# Patient Record
Sex: MALE | Race: OTHER | NOT HISPANIC OR LATINO | URBAN - METROPOLITAN AREA
[De-identification: names, ages, dates, MRNs, and addresses within clinical notes are randomized per-mention and may not be internally consistent; named-entity substitution may affect disease eponyms.]

---

## 2018-07-10 ENCOUNTER — EMERGENCY (EMERGENCY)
Facility: HOSPITAL | Age: 48
LOS: 1 days | Discharge: HOME | End: 2018-07-10
Attending: EMERGENCY MEDICINE | Admitting: EMERGENCY MEDICINE
Payer: MEDICAID

## 2018-07-10 VITALS
SYSTOLIC BLOOD PRESSURE: 138 MMHG | DIASTOLIC BLOOD PRESSURE: 88 MMHG | TEMPERATURE: 99 F | HEART RATE: 98 BPM | RESPIRATION RATE: 18 BRPM | OXYGEN SATURATION: 99 %

## 2018-07-10 VITALS
SYSTOLIC BLOOD PRESSURE: 125 MMHG | HEART RATE: 85 BPM | DIASTOLIC BLOOD PRESSURE: 93 MMHG | RESPIRATION RATE: 18 BRPM | OXYGEN SATURATION: 99 %

## 2018-07-10 LAB
ALBUMIN SERPL ELPH-MCNC: 4.5 G/DL — SIGNIFICANT CHANGE UP (ref 3.3–5)
ALP SERPL-CCNC: 38 U/L — LOW (ref 40–120)
ALT FLD-CCNC: 104 U/L — HIGH (ref 4–41)
AST SERPL-CCNC: 45 U/L — HIGH (ref 4–40)
BASE EXCESS BLDV CALC-SCNC: 5 MMOL/L — SIGNIFICANT CHANGE UP
BASOPHILS # BLD AUTO: 0.03 K/UL — SIGNIFICANT CHANGE UP (ref 0–0.2)
BASOPHILS NFR BLD AUTO: 0.4 % — SIGNIFICANT CHANGE UP (ref 0–2)
BILIRUB SERPL-MCNC: 1.2 MG/DL — SIGNIFICANT CHANGE UP (ref 0.2–1.2)
BLOOD GAS VENOUS - CREATININE: 0.9 MG/DL — SIGNIFICANT CHANGE UP (ref 0.5–1.3)
BUN SERPL-MCNC: 13 MG/DL — SIGNIFICANT CHANGE UP (ref 7–23)
CALCIUM SERPL-MCNC: 10 MG/DL — SIGNIFICANT CHANGE UP (ref 8.4–10.5)
CHLORIDE BLDV-SCNC: 107 MMOL/L — SIGNIFICANT CHANGE UP (ref 96–108)
CHLORIDE SERPL-SCNC: 102 MMOL/L — SIGNIFICANT CHANGE UP (ref 98–107)
CO2 SERPL-SCNC: 26 MMOL/L — SIGNIFICANT CHANGE UP (ref 22–31)
CREAT SERPL-MCNC: 0.93 MG/DL — SIGNIFICANT CHANGE UP (ref 0.5–1.3)
EOSINOPHIL # BLD AUTO: 0.07 K/UL — SIGNIFICANT CHANGE UP (ref 0–0.5)
EOSINOPHIL NFR BLD AUTO: 0.9 % — SIGNIFICANT CHANGE UP (ref 0–6)
GAS PNL BLDV: 141 MMOL/L — SIGNIFICANT CHANGE UP (ref 136–146)
GLUCOSE BLDV-MCNC: 108 — HIGH (ref 70–99)
GLUCOSE SERPL-MCNC: 110 MG/DL — HIGH (ref 70–99)
HCO3 BLDV-SCNC: 26 MMOL/L — SIGNIFICANT CHANGE UP (ref 20–27)
HCT VFR BLD CALC: 46 % — SIGNIFICANT CHANGE UP (ref 39–50)
HCT VFR BLDV CALC: 52.1 % — HIGH (ref 39–51)
HGB BLD-MCNC: 15.8 G/DL — SIGNIFICANT CHANGE UP (ref 13–17)
HGB BLDV-MCNC: 17 G/DL — SIGNIFICANT CHANGE UP (ref 13–17)
IMM GRANULOCYTES # BLD AUTO: 0.04 # — SIGNIFICANT CHANGE UP
IMM GRANULOCYTES NFR BLD AUTO: 0.5 % — SIGNIFICANT CHANGE UP (ref 0–1.5)
LACTATE BLDV-MCNC: 2.1 MMOL/L — HIGH (ref 0.5–2)
LIDOCAIN IGE QN: 50.5 U/L — SIGNIFICANT CHANGE UP (ref 7–60)
LYMPHOCYTES # BLD AUTO: 1.72 K/UL — SIGNIFICANT CHANGE UP (ref 1–3.3)
LYMPHOCYTES # BLD AUTO: 22.1 % — SIGNIFICANT CHANGE UP (ref 13–44)
MCHC RBC-ENTMCNC: 27.9 PG — SIGNIFICANT CHANGE UP (ref 27–34)
MCHC RBC-ENTMCNC: 34.3 % — SIGNIFICANT CHANGE UP (ref 32–36)
MCV RBC AUTO: 81.1 FL — SIGNIFICANT CHANGE UP (ref 80–100)
MONOCYTES # BLD AUTO: 0.76 K/UL — SIGNIFICANT CHANGE UP (ref 0–0.9)
MONOCYTES NFR BLD AUTO: 9.8 % — SIGNIFICANT CHANGE UP (ref 2–14)
NEUTROPHILS # BLD AUTO: 5.15 K/UL — SIGNIFICANT CHANGE UP (ref 1.8–7.4)
NEUTROPHILS NFR BLD AUTO: 66.3 % — SIGNIFICANT CHANGE UP (ref 43–77)
NRBC # FLD: 0 — SIGNIFICANT CHANGE UP
PCO2 BLDV: 54 MMHG — HIGH (ref 41–51)
PH BLDV: 7.37 PH — SIGNIFICANT CHANGE UP (ref 7.32–7.43)
PLATELET # BLD AUTO: 290 K/UL — SIGNIFICANT CHANGE UP (ref 150–400)
PMV BLD: 10.2 FL — SIGNIFICANT CHANGE UP (ref 7–13)
PO2 BLDV: 26 MMHG — LOW (ref 35–40)
POTASSIUM BLDV-SCNC: 3.7 MMOL/L — SIGNIFICANT CHANGE UP (ref 3.4–4.5)
POTASSIUM SERPL-MCNC: 4.1 MMOL/L — SIGNIFICANT CHANGE UP (ref 3.5–5.3)
POTASSIUM SERPL-SCNC: 4.1 MMOL/L — SIGNIFICANT CHANGE UP (ref 3.5–5.3)
PROT SERPL-MCNC: 8 G/DL — SIGNIFICANT CHANGE UP (ref 6–8.3)
RBC # BLD: 5.67 M/UL — SIGNIFICANT CHANGE UP (ref 4.2–5.8)
RBC # FLD: 12.4 % — SIGNIFICANT CHANGE UP (ref 10.3–14.5)
SAO2 % BLDV: 42.8 % — LOW (ref 60–85)
SODIUM SERPL-SCNC: 142 MMOL/L — SIGNIFICANT CHANGE UP (ref 135–145)
TSH SERPL-MCNC: 1.36 UIU/ML — SIGNIFICANT CHANGE UP (ref 0.27–4.2)
WBC # BLD: 7.77 K/UL — SIGNIFICANT CHANGE UP (ref 3.8–10.5)
WBC # FLD AUTO: 7.77 K/UL — SIGNIFICANT CHANGE UP (ref 3.8–10.5)

## 2018-07-10 PROCEDURE — 93010 ELECTROCARDIOGRAM REPORT: CPT

## 2018-07-10 PROCEDURE — 71046 X-RAY EXAM CHEST 2 VIEWS: CPT | Mod: 26

## 2018-07-10 PROCEDURE — 99284 EMERGENCY DEPT VISIT MOD MDM: CPT | Mod: 25

## 2018-07-10 RX ORDER — ACETAMINOPHEN 500 MG
975 TABLET ORAL ONCE
Qty: 0 | Refills: 0 | Status: COMPLETED | OUTPATIENT
Start: 2018-07-10 | End: 2018-07-10

## 2018-07-10 RX ORDER — ONDANSETRON 8 MG/1
4 TABLET, FILM COATED ORAL ONCE
Qty: 0 | Refills: 0 | Status: COMPLETED | OUTPATIENT
Start: 2018-07-10 | End: 2018-07-10

## 2018-07-10 RX ORDER — SODIUM CHLORIDE 9 MG/ML
1000 INJECTION INTRAMUSCULAR; INTRAVENOUS; SUBCUTANEOUS ONCE
Qty: 0 | Refills: 0 | Status: COMPLETED | OUTPATIENT
Start: 2018-07-10 | End: 2018-07-10

## 2018-07-10 RX ADMIN — Medication 975 MILLIGRAM(S): at 15:49

## 2018-07-10 RX ADMIN — ONDANSETRON 4 MILLIGRAM(S): 8 TABLET, FILM COATED ORAL at 16:18

## 2018-07-10 RX ADMIN — SODIUM CHLORIDE 1000 MILLILITER(S): 9 INJECTION INTRAMUSCULAR; INTRAVENOUS; SUBCUTANEOUS at 15:40

## 2018-07-10 NOTE — ED ADULT NURSE NOTE - OBJECTIVE STATEMENT
Break coverage- Pt received to intake spot #1. AAOx4, c/o abdominal pain/back pain and nausea x1 week. Also c/o intermittent SOB and chills x few weeks. Pt states he was diagnosed with typhoid fever and admitted in the hospital in [facility]. Respiratory even and unlabored at this time. No vomiting. VSS as documented. MD schilling performed. #20g IVSL placed to right ac, labs drawn and sent. no acute distress. Awaiting further plan of care.

## 2018-07-10 NOTE — ED PROVIDER NOTE - OBJECTIVE STATEMENT
46 yo M w/out pmh p/w intermittent chest / back discomfort and chills x 1 wk. +sob. Reports was dx'd w/ typhoid fever and admitted to hospital 6/19 in [facility], treated with doses of ciprofloxacin and doxycycline that didn't tolerate; finished iv meropenem and po cefuroxamine course (finished 7/4). No abd pain, rash, cough. Traveled to [place] 7/2. 48 yo M w/out pmh p/w intermittent abd / back discomfort and chills x 1 wk. +sob. Reports was dx'd w/ typhoid fever and admitted to hospital 6/19 in [facility], treated with doses of ciprofloxacin and doxycycline that didn't tolerate; finished iv meropenem and po cefuroxamine course (finished 7/4). No abd pain, rash, cough. Traveled to [place] 7/2.

## 2018-07-10 NOTE — ED PROVIDER NOTE - MEDICAL DECISION MAKING DETAILS
48 yo M w/ recent dx/tx typhoid fever; no active chest / back pain now or sob, will check labs, cxr, ekg, give fluids/tylenol, reassess 46 yo M w/ recent dx/tx typhoid fever; no active abd / back pain now or sob, will check labs, cxr, ekg, give fluids/tylenol, reassess

## 2018-07-10 NOTE — ED PROVIDER NOTE - ATTENDING CONTRIBUTION TO CARE
Pt was seen and evaluated by me. Pt is a 48 y/o male with no significant PMHx presenting to the ED for intermittent abd and back discomfort X 1 wk. Pt states over the past week having intermittent abd discomfort with some SOB. Pt notes he is from [facility] and was found to have typhoid and started on antibiotics with minimal improvement. Pt also notes having chills. Denies any fever, nausea, vomiting, chest pain, diarrhea, or dysuria. Lungs CTA b/l. RRR. Abd soft, non-tender.

## 2018-07-11 LAB
SPECIMEN SOURCE: SIGNIFICANT CHANGE UP
SPECIMEN SOURCE: SIGNIFICANT CHANGE UP

## 2018-07-11 RX ORDER — CLARITHROMYCIN 500 MG
1 TABLET ORAL
Qty: 1 | Refills: 0 | OUTPATIENT
Start: 2018-07-11 | End: 2018-07-15

## 2018-07-11 NOTE — ED POST DISCHARGE NOTE - RESULT SUMMARY
Pt called. Rx for Zithromax not sent to pharmacy. I confirmed in chart pt should be on ABX and sent rx in at pt request.

## 2018-07-13 ENCOUNTER — INPATIENT (INPATIENT)
Facility: HOSPITAL | Age: 48
LOS: 2 days | Discharge: HOME | End: 2018-07-16
Attending: HOSPITALIST | Admitting: HOSPITALIST
Payer: MEDICAID

## 2018-07-13 VITALS
RESPIRATION RATE: 20 BRPM | SYSTOLIC BLOOD PRESSURE: 131 MMHG | HEART RATE: 94 BPM | DIASTOLIC BLOOD PRESSURE: 95 MMHG | OXYGEN SATURATION: 100 % | TEMPERATURE: 99 F
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
## 2018-07-13 DIAGNOSIS — A01.00 TYPHOID FEVER, UNSPECIFIED: ICD-10-CM

## 2018-07-13 DIAGNOSIS — F41.8 OTHER SPECIFIED ANXIETY DISORDERS: ICD-10-CM

## 2018-07-13 LAB
ALBUMIN SERPL ELPH-MCNC: 4.5 G/DL — SIGNIFICANT CHANGE UP (ref 3.3–5)
ALP SERPL-CCNC: 26 U/L — LOW (ref 40–120)
ALT FLD-CCNC: 133 U/L — HIGH (ref 4–41)
APPEARANCE UR: CLEAR — SIGNIFICANT CHANGE UP
AST SERPL-CCNC: 96 U/L — HIGH (ref 4–40)
BILIRUB SERPL-MCNC: 1.6 MG/DL — HIGH (ref 0.2–1.2)
BILIRUB UR-MCNC: NEGATIVE — SIGNIFICANT CHANGE UP
BLOOD UR QL VISUAL: NEGATIVE — SIGNIFICANT CHANGE UP
BUN SERPL-MCNC: 11 MG/DL — SIGNIFICANT CHANGE UP (ref 7–23)
CALCIUM SERPL-MCNC: 9.8 MG/DL — SIGNIFICANT CHANGE UP (ref 8.4–10.5)
CHLORIDE SERPL-SCNC: 99 MMOL/L — SIGNIFICANT CHANGE UP (ref 98–107)
CO2 SERPL-SCNC: 21 MMOL/L — LOW (ref 22–31)
COD CRY URNS QL: SIGNIFICANT CHANGE UP (ref 0–0)
COLOR SPEC: YELLOW — SIGNIFICANT CHANGE UP
CREAT SERPL-MCNC: 0.81 MG/DL — SIGNIFICANT CHANGE UP (ref 0.5–1.3)
GLUCOSE SERPL-MCNC: 84 MG/DL — SIGNIFICANT CHANGE UP (ref 70–99)
GLUCOSE UR-MCNC: NEGATIVE — SIGNIFICANT CHANGE UP
HCT VFR BLD CALC: 47.8 % — SIGNIFICANT CHANGE UP (ref 39–50)
HGB BLD-MCNC: 16.2 G/DL — SIGNIFICANT CHANGE UP (ref 13–17)
KETONES UR-MCNC: SIGNIFICANT CHANGE UP
LEUKOCYTE ESTERASE UR-ACNC: NEGATIVE — SIGNIFICANT CHANGE UP
MCHC RBC-ENTMCNC: 28.2 PG — SIGNIFICANT CHANGE UP (ref 27–34)
MCHC RBC-ENTMCNC: 33.9 % — SIGNIFICANT CHANGE UP (ref 32–36)
MCV RBC AUTO: 83.1 FL — SIGNIFICANT CHANGE UP (ref 80–100)
MUCOUS THREADS # UR AUTO: SIGNIFICANT CHANGE UP
NITRITE UR-MCNC: NEGATIVE — SIGNIFICANT CHANGE UP
NRBC # FLD: 0 — SIGNIFICANT CHANGE UP
PH UR: 6 — SIGNIFICANT CHANGE UP (ref 4.6–8)
PLATELET # BLD AUTO: 306 K/UL — SIGNIFICANT CHANGE UP (ref 150–400)
PMV BLD: 10.5 FL — SIGNIFICANT CHANGE UP (ref 7–13)
POTASSIUM SERPL-MCNC: 6.1 MMOL/L — HIGH (ref 3.5–5.3)
POTASSIUM SERPL-SCNC: 6.1 MMOL/L — HIGH (ref 3.5–5.3)
PROT SERPL-MCNC: 8.2 G/DL — SIGNIFICANT CHANGE UP (ref 6–8.3)
PROT UR-MCNC: NEGATIVE MG/DL — SIGNIFICANT CHANGE UP
RBC # BLD: 5.75 M/UL — SIGNIFICANT CHANGE UP (ref 4.2–5.8)
RBC # FLD: 12.4 % — SIGNIFICANT CHANGE UP (ref 10.3–14.5)
RBC CASTS # UR COMP ASSIST: HIGH (ref 0–?)
SODIUM SERPL-SCNC: 137 MMOL/L — SIGNIFICANT CHANGE UP (ref 135–145)
SP GR SPEC: 1.02 — SIGNIFICANT CHANGE UP (ref 1–1.04)
SQUAMOUS # UR AUTO: SIGNIFICANT CHANGE UP
UROBILINOGEN FLD QL: NORMAL MG/DL — SIGNIFICANT CHANGE UP
WBC # BLD: 6.71 K/UL — SIGNIFICANT CHANGE UP (ref 3.8–10.5)
WBC # FLD AUTO: 6.71 K/UL — SIGNIFICANT CHANGE UP (ref 3.8–10.5)
WBC UR QL: SIGNIFICANT CHANGE UP (ref 0–?)

## 2018-07-13 PROCEDURE — 99223 1ST HOSP IP/OBS HIGH 75: CPT | Mod: GC

## 2018-07-13 RX ORDER — CEFTRIAXONE 500 MG/1
1 INJECTION, POWDER, FOR SOLUTION INTRAMUSCULAR; INTRAVENOUS ONCE
Qty: 0 | Refills: 0 | Status: COMPLETED | OUTPATIENT
Start: 2018-07-13 | End: 2018-07-13

## 2018-07-13 RX ADMIN — CEFTRIAXONE 100 GRAM(S): 500 INJECTION, POWDER, FOR SOLUTION INTRAMUSCULAR; INTRAVENOUS at 19:41

## 2018-07-13 NOTE — ED PROVIDER NOTE - MEDICAL DECISION MAKING DETAILS
Pt's episodes of fear/shaking concerning for anxiety/panic attacks, however unable to rule out Typhoid fever / typhoid carrying state. Will Admit to treat. Pt's episodes of fear/shaking concerning for anxiety/panic attacks, however unable to rule out Typhoid fever, will Admit to treat.

## 2018-07-13 NOTE — H&P ADULT - NSHPLABSRESULTS_GEN_ALL_CORE
Labs:     CBC Full  -  ( 13 Jul 2018 17:35 )  WBC Count : 6.71 K/uL  Hemoglobin : 16.2 g/dL  Hematocrit : 47.8 %  Platelet Count - Automated : 306 K/uL  Mean Cell Volume : 83.1 fL  Mean Cell Hemoglobin : 28.2 pg  Mean Cell Hemoglobin Concentration : 33.9 %  Auto Neutrophil # : x  Auto Lymphocyte # : x  Auto Monocyte # : x  Auto Eosinophil # : x  Auto Basophil # : x  Auto Neutrophil % : x  Auto Lymphocyte % : x  Auto Monocyte % : x  Auto Eosinophil % : x  Auto Basophil % : x    07-13    137  |  99  |  11  ----------------------------<  84  6.1<H>   |  21<L>  |  0.81    Ca    9.8      13 Jul 2018 17:35    TPro  8.2  /  Alb  4.5  /  TBili  1.6<H>  /  DBili  x   /  AST  96<H>  /  ALT  133<H>  /  AlkPhos  26<L>  07-13

## 2018-07-13 NOTE — ED ADULT NURSE NOTE - OBJECTIVE STATEMENT
Pt rcvd by INT RN - admitted to [facility] for Typhoid Fever.  Pt came to ED for c/o weakness, anxiety, "feeling unwell" for approx 3 wks.  Was previously treated on antibiotics for Typhoid Fever w/o resolution of symptoms since mid June w/ multiple hospital visits for same.  No significant PMHx or daily medications.  Pt aaox3, ambulatory but weak feeling.  Has IV to R AC #20g.  Previously given Ceftriaxone IV in ED.  Pt vitally stable.  Mask applied.  Awaiting further plan of care.

## 2018-07-13 NOTE — H&P ADULT - ASSESSMENT
47 Y Male with subjective Hx of typhoid fever, coming into hospital with symptoms of nervousness anxiety in the setting of recent typhoid infection.

## 2018-07-13 NOTE — H&P ADULT - PROBLEM SELECTOR PLAN 2
- Symptoms resolved from prior visit  - Trend Vitals - Symptoms resolved from prior visit  - Trend Vitals  - Stool Culture - Likely resolved. No longer having symptoms   - Trend Vitals  - Hold antibiotics

## 2018-07-13 NOTE — ED PROVIDER NOTE - SKIN NEGATIVE STATEMENT, MLM
no abrasions, no jaundice, no lesions, no pruritis, and no rashes. No spots or any other lesions on the abdomen.

## 2018-07-13 NOTE — H&P ADULT - NSHPREVIEWOFSYSTEMS_GEN_ALL_CORE
Review of Systems:   CONSTITUTIONAL: No fever/chills, + Loss of appetite  EYES: No eye pain, visual disturbances, or discharge  ENMT:  No difficulty hearing, tinnitus, vertigo; No sinus or throat pain  NECK: No pain or stiffness  BREASTS: No pain, masses, or nipple discharge  RESPIRATORY: No cough, wheezing, chills or hemoptysis; No shortness of breath  CARDIOVASCULAR: No chest pain, palpitations, dizziness, or leg swelling  GASTROINTESTINAL: +abdominal discomfort. No nausea, vomiting, or hematemesis; +diarrhea starting today. No constipation. No melena or hematochezia.  GENITOURINARY: No dysuria, frequency, hematuria, or incontinence  NEUROLOGICAL:   SKIN: No itching, burning, rashes, or lesions   LYMPH NODES: No enlarged glands  ENDOCRINE: No heat or cold intolerance; No hair loss  MUSCULOSKELETAL: No joint pain or swelling  PSYCHIATRIC: +anxiety, +restlessness  HEME/LYMPH: No easy bruising, or bleeding gums  ALLERY AND IMMUNOLOGIC: No hives or eczema Review of Systems:   CONSTITUTIONAL: No fever/chills, + Loss of appetite  EYES: No eye pain, visual disturbances, or discharge  ENMT:  No difficulty hearing, tinnitus, vertigo; No sinus or throat pain  NECK: No pain or stiffness  BREASTS: No pain, masses, or nipple discharge  RESPIRATORY: No cough, wheezing, chills or hemoptysis; No shortness of breath  CARDIOVASCULAR: No chest pain, palpitations, dizziness, or leg swelling  GASTROINTESTINAL: +abdominal discomfort. No nausea, vomiting, or hematemesis; +diarrhea starting today. No constipation. No melena or hematochezia.  GENITOURINARY: No dysuria, frequency, hematuria, or incontinence  NEUROLOGICAL: no dizziness, no headaches, no weakness  SKIN: No itching, burning, rashes, or lesions   LYMPH NODES: No enlarged glands  ENDOCRINE: No heat or cold intolerance; No hair loss  MUSCULOSKELETAL: No joint pain or swelling  PSYCHIATRIC: +anxiety, +restlessness  HEME/LYMPH: No easy bruising, or bleeding gums  ALLERGY AND IMMUNOLOGIC: No hives or eczema

## 2018-07-13 NOTE — ED PROVIDER NOTE - ATTENDING CONTRIBUTION TO CARE
48 yo M recently traveling from [facility] where he was diagnosed with typhoid s/p being treated with ciprofloxacin and doxycycline that he didn't tolerate; finished iv meropenem and po Cefuroxamine course (finished 7/4) on an inpatient visit. Continued to have the same sx and came back to the ER on 7/10 for which he was started on Azithromycin. Pt took this as prescribed although he did not improve, and came back today. Pt had some abdominal pain and intermittent fevers in June, however never n/v/or d. The pt's son is in [facility] today for n/v/d. Will discuss with Infectious disease as the patient appears to be a carrier and his son has symptoms of what may be typhoid. Pt describes palpitations, with multiple episodes of SOB, fear and "shakiness" lasting for 5 minutes for a few months for which he has seen a psychiatrist and been given meds which he does not take because he does not think that they decrease the symptoms.   -labs (cbc, cmp, tsh), fluids, reassess  I performed a history and physical exam of the patient and discussed their management with the resident. I reviewed the resident's note and agree with the documented findings and plan of care. My medical decision making and observations are found above.

## 2018-07-13 NOTE — H&P ADULT - NSHPSOCIALHISTORY_GEN_ALL_CORE
From [facility], has been in [facility] with Brother for past few months  No history of tobacco, alcohol, or drug use

## 2018-07-13 NOTE — ED ADULT TRIAGE NOTE - CHIEF COMPLAINT QUOTE
c/o feeling weak pt was treated on July 10 in ER for chills placed on Azithromycin pt returns states "I am still not feeling well"  pt history of typhoid fever

## 2018-07-13 NOTE — H&P ADULT - HISTORY OF PRESENT ILLNESS
48yo M,Patient is from [facility]. In June Pt had onset of chills, intermittent abdominal pain and loss of appetite. Patient was admitted to a hospital in [facility], treated with doses of ciprofloxacin and doxycycline that he didn't tolerate (unable to state why); finished iv meropenem and po Cefuroxamine course (finished 7/4). Today Pt reports feeling as if something is not right, when asked to explain further, pt indicates the he just feels frightened and nervous. Denies: CP, SOB, Palpitations GI discomfort, dysuria, hematuria. Headache, nausea and vomiting. Pt reports that he developed this anxiety a month ago and attributes it to being diagnosed with typhoid fever in [facility]. 48yo M,Patient is from [facility] living in [facility]. In June Pt had onset of chills, intermittent abdominal pain and loss of appetite. Patient was admitted to a hospital in [facility], treated with doses of ciprofloxacin and doxycycline that he didn't tolerate (unable to state why); finished iv meropenem and po Cefuroxamine course (finished 7/4). Today Pt reports feeling as if something is not right, when asked to explain further, pt indicates the he just feels frightened and nervous. Denies: CP, SOB, Palpitations GI discomfort, dysuria, hematuria. Headache, nausea and vomiting. Pt reports that he developed this anxiety a month ago and attributes it to being diagnosed with typhoid fever in [facility]. Today he has had decreased Appetite, only having breakfast. 48yo M,Patient is from [facility] living in [facility]. In June Pt had onset of chills, intermittent abdominal pain and loss of appetite. Patient was admitted to a hospital in [facility], treated with doses of ciprofloxacin and doxycycline that he didn't tolerate (unable to state why); finished iv meropenem and po Cefuroxamine course (finished 7/4). Today Pt reports feeling as if something is not right, when asked to explain further, pt indicates the he just feels frightened and nervous. Denies: CP, SOB, Palpitations GI discomfort, dysuria, hematuria. Headache, nausea and vomiting. Pt reports that he developed this anxiety a month ago and attributes it to being diagnosed with typhoid fever in [facility]. Today he has had decreased Appetite, only having breakfast. he additionally reports 1x episode of diarrhea in hospital.

## 2018-07-13 NOTE — H&P ADULT - PROBLEM SELECTOR PLAN 1
-c/w Psychiatry  - consider possible Anxiolytic med  - TFTS - Consult Psychiatry in am   - consider possible Anxiolytic med  - Pt has poor appetite, also reports feeling depressed. Denies dysphagia/odynophagia, nausea or vomiting

## 2018-07-13 NOTE — ED PROVIDER NOTE - GASTROINTESTINAL NEGATIVE STATEMENT, MLM
no current abdominal pain, no bloating, no constipation, no diarrhea, and no vomiting. Has Nausea and complete loss of appetite

## 2018-07-13 NOTE — ED PROVIDER NOTE - OBJECTIVE STATEMENT
Patient is from [facility] living in [facility]. In June Pt had onset of chills, intermittent abdominal pain and loss of appetite. Patient was admitted to a hospital in [facility], treated with doses of ciprofloxacin and doxycycline that he didn't tolerate (unable to state why); finished iv meropenem and po Cefuroxamine course (finished 7/4). Continued to have the same sx and came back to the ER on 7/10 for which he was started on Azithromycin. Pt took this as prescribed although he did not improve, and came back today. States that many people have been diagnosed with Tyhpoid fever in his area in [facility] recently, however since coming to the [place] his son has developed vomiting/diarrhea and was diagnosed with Gastroenteritis at [facility]. Patient has also been experiencing episodes of SOB, fear and "shakiness" lasting for 5 minuetes for a few months which also worsened in early June, pt denies any new stressors or any other changes to his life recently.  No pain anywhere now, has not had any diarrhea or vomiting. No fevers. Patient is from [facility] living in [facility]. In June Pt had onset of chills, intermittent abdominal pain and loss of appetite. Patient was admitted to a hospital in [facility], treated with doses of ciprofloxacin and doxycycline that he didn't tolerate (unable to state why); finished iv meropenem and po Cefuroxamine course (finished 7/4). Continued to have the same sx and came back to the ER on 7/10 for which he was started on Azithromycin. Pt took this as prescribed although he did not improve, and came back today. States that many people have been diagnosed with Tyhpoid fever in his area in [facility] recently. Since coming to the  his son has developed vomiting/diarrhea and was diagnosed with Gastroenteritis at [facility]. Patient has also been experiencing episodes of SOB, fear and "shakiness" lasting for 5 minuetes for a few months which also worsened in early June, pt denies any new stressors or any other changes to his life recently.  No pain anywhere now, has not had any diarrhea or vomiting. No fevers.

## 2018-07-13 NOTE — H&P ADULT - PROBLEM SELECTOR PLAN 3
- No abdominal pain  - Will trend LFTs. Obtain US abdomen - No abdominal pain. Episode of diarrhea in ED but was related to feeling of anxiety and stress. Possible IBS  - Will trend LFTs. Obtain US abdomen - No abdominal pain. Episode of diarrhea in ED but was related to feeling of anxiety and stress. Possible IBS. Consider immodium  - Will trend LFTs. Obtain US abdomen

## 2018-07-13 NOTE — H&P ADULT - NSHPPHYSICALEXAM_GEN_ALL_CORE
PHYSICAL EXAM:  GENERAL: NAD, well-developed  HEAD:  Atraumatic, Normocephalic  NECK: Supple, No JVD  CHEST/LUNG: Clear to auscultation bilaterally; No wheeze  HEART: Regular rate and rhythm; No murmurs, rubs, or gallops  ABDOMEN: Soft, Nontender, Nondistended; Bowel sounds present  EXTREMITIES:, No clubbing, cyanosis, or edema  PSYCH: AAOx3  NEUROLOGY: non-focal  SKIN: No rashes or lesions T(C): 36.9 (07-13-18 @ 20:37), Max: 37.1 (07-13-18 @ 15:55)  HR: 80 (07-13-18 @ 20:37) (80 - 94)  BP: 129/91 (07-13-18 @ 20:37) (129/91 - 131/95)  RR: 16 (07-13-18 @ 20:37) (16 - 20)  SpO2: 98% (07-13-18 @ 20:37) (98% - 100%)    PHYSICAL EXAM:  GENERAL: NAD, well-developed  HEAD:  Atraumatic, Normocephalic  NECK: Supple, No JVD  CHEST/LUNG: Clear to auscultation bilaterally; No wheeze  HEART: Regular rate and rhythm; No murmurs, rubs, or gallops  ABDOMEN: Soft, Nontender, Nondistended; Bowel sounds present  EXTREMITIES:, No clubbing, cyanosis, or edema  PSYCH: AAOx3, normal affect   NEUROLOGY: non-focal, 5/5 strength throughout, normal sensation   SKIN: No rashes or lesions

## 2018-07-14 ENCOUNTER — TRANSCRIPTION ENCOUNTER (OUTPATIENT)
Age: 48
End: 2018-07-14

## 2018-07-14 DIAGNOSIS — R74.8 ABNORMAL LEVELS OF OTHER SERUM ENZYMES: ICD-10-CM

## 2018-07-14 LAB
ALBUMIN SERPL ELPH-MCNC: 4.3 G/DL — SIGNIFICANT CHANGE UP (ref 3.3–5)
ALP SERPL-CCNC: 36 U/L — LOW (ref 40–120)
ALT FLD-CCNC: 113 U/L — HIGH (ref 4–41)
AST SERPL-CCNC: 47 U/L — HIGH (ref 4–40)
BASOPHILS # BLD AUTO: 0.02 K/UL — SIGNIFICANT CHANGE UP (ref 0–0.2)
BASOPHILS NFR BLD AUTO: 0.3 % — SIGNIFICANT CHANGE UP (ref 0–2)
BILIRUB DIRECT SERPL-MCNC: 0.2 MG/DL — SIGNIFICANT CHANGE UP (ref 0.1–0.2)
BILIRUB SERPL-MCNC: 1.6 MG/DL — HIGH (ref 0.2–1.2)
BUN SERPL-MCNC: 13 MG/DL — SIGNIFICANT CHANGE UP (ref 7–23)
BUN SERPL-MCNC: 14 MG/DL — SIGNIFICANT CHANGE UP (ref 7–23)
CALCIUM SERPL-MCNC: 9.6 MG/DL — SIGNIFICANT CHANGE UP (ref 8.4–10.5)
CALCIUM SERPL-MCNC: 9.8 MG/DL — SIGNIFICANT CHANGE UP (ref 8.4–10.5)
CHLORIDE SERPL-SCNC: 101 MMOL/L — SIGNIFICANT CHANGE UP (ref 98–107)
CHLORIDE SERPL-SCNC: 99 MMOL/L — SIGNIFICANT CHANGE UP (ref 98–107)
CO2 SERPL-SCNC: 24 MMOL/L — SIGNIFICANT CHANGE UP (ref 22–31)
CO2 SERPL-SCNC: 26 MMOL/L — SIGNIFICANT CHANGE UP (ref 22–31)
CREAT SERPL-MCNC: 0.86 MG/DL — SIGNIFICANT CHANGE UP (ref 0.5–1.3)
CREAT SERPL-MCNC: 0.91 MG/DL — SIGNIFICANT CHANGE UP (ref 0.5–1.3)
EOSINOPHIL # BLD AUTO: 0.08 K/UL — SIGNIFICANT CHANGE UP (ref 0–0.5)
EOSINOPHIL NFR BLD AUTO: 1.1 % — SIGNIFICANT CHANGE UP (ref 0–6)
GLUCOSE SERPL-MCNC: 102 MG/DL — HIGH (ref 70–99)
GLUCOSE SERPL-MCNC: 176 MG/DL — HIGH (ref 70–99)
HAPTOGLOB SERPL-MCNC: 152 MG/DL — SIGNIFICANT CHANGE UP (ref 34–200)
HAV IGM SER-ACNC: NONREACTIVE — SIGNIFICANT CHANGE UP
HBV CORE IGM SER-ACNC: NONREACTIVE — SIGNIFICANT CHANGE UP
HBV SURFACE AG SER-ACNC: NONREACTIVE — SIGNIFICANT CHANGE UP
HCT VFR BLD CALC: 45.8 % — SIGNIFICANT CHANGE UP (ref 39–50)
HCV AB S/CO SERPL IA: 0.12 S/CO — SIGNIFICANT CHANGE UP
HCV AB SERPL-IMP: SIGNIFICANT CHANGE UP
HGB BLD-MCNC: 15.5 G/DL — SIGNIFICANT CHANGE UP (ref 13–17)
IMM GRANULOCYTES # BLD AUTO: 0.02 # — SIGNIFICANT CHANGE UP
IMM GRANULOCYTES NFR BLD AUTO: 0.3 % — SIGNIFICANT CHANGE UP (ref 0–1.5)
LDH SERPL L TO P-CCNC: 151 U/L — SIGNIFICANT CHANGE UP (ref 135–225)
LYMPHOCYTES # BLD AUTO: 2.42 K/UL — SIGNIFICANT CHANGE UP (ref 1–3.3)
LYMPHOCYTES # BLD AUTO: 34.5 % — SIGNIFICANT CHANGE UP (ref 13–44)
MAGNESIUM SERPL-MCNC: 2 MG/DL — SIGNIFICANT CHANGE UP (ref 1.6–2.6)
MCHC RBC-ENTMCNC: 27.8 PG — SIGNIFICANT CHANGE UP (ref 27–34)
MCHC RBC-ENTMCNC: 33.8 % — SIGNIFICANT CHANGE UP (ref 32–36)
MCV RBC AUTO: 82.2 FL — SIGNIFICANT CHANGE UP (ref 80–100)
MONOCYTES # BLD AUTO: 0.79 K/UL — SIGNIFICANT CHANGE UP (ref 0–0.9)
MONOCYTES NFR BLD AUTO: 11.3 % — SIGNIFICANT CHANGE UP (ref 2–14)
NEUTROPHILS # BLD AUTO: 3.69 K/UL — SIGNIFICANT CHANGE UP (ref 1.8–7.4)
NEUTROPHILS NFR BLD AUTO: 52.5 % — SIGNIFICANT CHANGE UP (ref 43–77)
NRBC # FLD: 0 — SIGNIFICANT CHANGE UP
PHOSPHATE SERPL-MCNC: 4.2 MG/DL — SIGNIFICANT CHANGE UP (ref 2.5–4.5)
PLATELET # BLD AUTO: 258 K/UL — SIGNIFICANT CHANGE UP (ref 150–400)
PMV BLD: 10.6 FL — SIGNIFICANT CHANGE UP (ref 7–13)
POTASSIUM SERPL-MCNC: 3.5 MMOL/L — SIGNIFICANT CHANGE UP (ref 3.5–5.3)
POTASSIUM SERPL-MCNC: 3.7 MMOL/L — SIGNIFICANT CHANGE UP (ref 3.5–5.3)
POTASSIUM SERPL-SCNC: 3.5 MMOL/L — SIGNIFICANT CHANGE UP (ref 3.5–5.3)
POTASSIUM SERPL-SCNC: 3.7 MMOL/L — SIGNIFICANT CHANGE UP (ref 3.5–5.3)
PROT SERPL-MCNC: 7.3 G/DL — SIGNIFICANT CHANGE UP (ref 6–8.3)
RBC # BLD: 5.57 M/UL — SIGNIFICANT CHANGE UP (ref 4.2–5.8)
RBC # FLD: 12.2 % — SIGNIFICANT CHANGE UP (ref 10.3–14.5)
SODIUM SERPL-SCNC: 140 MMOL/L — SIGNIFICANT CHANGE UP (ref 135–145)
SODIUM SERPL-SCNC: 140 MMOL/L — SIGNIFICANT CHANGE UP (ref 135–145)
TSH SERPL-MCNC: 1.89 UIU/ML — SIGNIFICANT CHANGE UP (ref 0.27–4.2)
WBC # BLD: 7.02 K/UL — SIGNIFICANT CHANGE UP (ref 3.8–10.5)
WBC # FLD AUTO: 7.02 K/UL — SIGNIFICANT CHANGE UP (ref 3.8–10.5)

## 2018-07-14 PROCEDURE — 99232 SBSQ HOSP IP/OBS MODERATE 35: CPT | Mod: GC

## 2018-07-14 NOTE — PROGRESS NOTE ADULT - PROBLEM SELECTOR PLAN 1
- Consult Psychiatry in am   - consider possible Anxiolytic med  - Pt has poor appetite, also reports feeling depressed. Denies dysphagia/odynophagia, nausea or vomiting

## 2018-07-14 NOTE — DISCHARGE NOTE ADULT - PROVIDER TOKENS
FREE:[[facility]PHONE:[(   )    -],FAX:[(   )    -],ADDRESS:[[phone]  [address] [place] [address]]]

## 2018-07-14 NOTE — PROGRESS NOTE ADULT - SUBJECTIVE AND OBJECTIVE BOX
Patient is a 47y old  Male who presents with a chief complaint of Anxiety (2018 20:29)        SUBJECTIVE / OVERNIGHT EVENTS: As per night float Pt, they were called because pt complaining of b/l leg tremor, Pt indicated to me that " his whole body was shaking:, episodes have resolved and Pt has no other complaints, denies: CP, SOB, Abd pain, Dysuria, HA, N/V, Dizziness       MEDICATIONS  (STANDING):    MEDICATIONS  (PRN):      T(C): 36.9 (18 @ 10:00), Max: 37.1 (18 @ 15:55)  HR: 89 (18 @ 10:00) (80 - 96)  BP: 110/82 (18 @ 10:00) (110/82 - 152/99)  RR: 18 (18 @ 10:00) (16 - 20)  SpO2: 100% (18 @ 10:00) (98% - 100%)  CAPILLARY BLOOD GLUCOSE        I&O's Summary      PHYSICAL EXAM  GENERAL: NAD, well-developed  HEAD:  Atraumatic, Normocephalic  CHEST/LUNG: Clear to auscultation bilaterally; No wheeze, rales or roncho  HEART: normal S1 and S2; No murmurs, rubs, or gallops  ABDOMEN: Nontender, Nondistended; normalactive BS  EXTREMITIES:  no edema, no cyanosis  PSYCH: AAOx3      LABS:                        15.5   7.02  )-----------( 258      ( 2018 07:00 )             45.8     -14    140  |  101  |  14  ----------------------------<  102<H>  3.7   |  24  |  0.91    Ca    9.8      2018 07:00  Phos  4.2     -  Mg     2.0         TPro  7.3  /  Alb  4.3  /  TBili  1.6<H>  /  DBili  0.2  /  AST  47<H>  /  ALT  113<H>  /  AlkPhos  36<L>            Urinalysis Basic - ( 2018 20:24 )    Color: YELLOW / Appearance: CLEAR / S.018 / pH: 6.0  Gluc: NEGATIVE / Ketone: MODERATE  / Bili: NEGATIVE / Urobili: NORMAL mg/dL   Blood: NEGATIVE / Protein: NEGATIVE mg/dL / Nitrite: NEGATIVE   Leuk Esterase: NEGATIVE / RBC: 5-10 / WBC 2-5   Sq Epi: OCC / Non Sq Epi: x / Bacteria: x        RADIOLOGY & ADDITIONAL TESTS:    Imaging Personally Reviewed:  Consultant(s) Notes Reviewed:    Care Discussed with Consultants/Other Providers:

## 2018-07-14 NOTE — DISCHARGE NOTE ADULT - PLAN OF CARE
Follow-up with your primary care doctor You came to the emergency department with symptoms of uneasiness in the setting of past Typhoid Fever infection, you were found to have slightly elevated liver function tests including Bilirubin 1.6, AST/ALT  45/104, and low Alk Phos 36. You had an ultrasound of the abdomen to evaluate for cause. You had an acute hepatitis panel (negative) and EBV/CMV (pending result) ordered. Follow-up with a primary care doctor, psychiatric evaluation We recommend that you follow-up with a primary care physician, you can call to make an appointment at the [facility], phone number listed below, or other doctor. You may be considered for a medication to treat anxiety. We recommend that you also be further evaluated by psychiatry as referred by your primary care doctor You came to the emergency department with symptoms of uneasiness in the setting of past Typhoid Fever infection, you were found to have slightly elevated liver function tests including Bilirubin 1.6, AST/ALT  45/104, and low Alk Phos 36. You had an ultrasound of the abdomen to evaluate for cause. You had an acute hepatitis panel (negative), CMV (negative) and EBV demonstrating past infection. You had an ultrasound of your liver that demonstrated Hepatic steatosis, or infiltration of fat into the liver. We recommend you eat a healthy diet high in vegetables and low in saturated fats and refined processed sugars/carbohydrates. Please follow-up with a Primary Care Doctor, you may call the [facility] (phone number below) or other physician of your choice. The [facility] has also been informed of you and will reach out to you for appointment, but please call if you do not hear from them within the next 48 hours. We recommend that you follow-up with a primary care physician, you can call to make an appointment at the [facility], phone number listed below, or other doctor. You may be considered for a medication to treat anxiety. We recommend that you also be further evaluated by psychiatry as referred by your primary care doctor. You were given contact information by our Psychiatry team to follow-up with them within the next few days. You can contact our Internal Medicine Clinic [facility] at the number below, your information was given to them so they may reach out within the next few days.

## 2018-07-14 NOTE — DISCHARGE NOTE ADULT - PATIENT PORTAL LINK FT
You can access the [facility] Patient Portal, offered by [facility], by registering with the following website: [email or web address]

## 2018-07-14 NOTE — DISCHARGE NOTE ADULT - CARE PROVIDER_API CALL
[facility],   [phone] [address] [place], [place] [address]  Phone: (   )    -  Fax: (   )    -

## 2018-07-14 NOTE — PROGRESS NOTE ADULT - ATTENDING COMMENTS
Pt seen and examined by me   Pt with very vague symptoms- body ache, anxious about health, poor appetite, general feeling of ill being   Symptoms started after he was treated for Typhpoid n [facility] a few months back  Pt reports he was started on Klonopin and Citalopra, in [facility] but stopped it in 2 days as he felt it was not helping him  Tolerating PO, no nausea or vomiting   Labs with mild elevated LFTS  FU Ultrasound Abdomen  FU EBV/CMV serologies which are already orderd and sent   DC planning 35 mins Pt seen and examined by me   Pt with very vague symptoms- body ache, anxious about health, poor appetite, general feeling of ill being   Symptoms started after he was treated for Typhpoid n [facility] a few months back  Recently completed a course of Abx  Pt reports he was started on Klonopin and Citalopra, in [facility] but stopped it in 2 days as he felt it was not helping him  Tolerating PO, no nausea or vomiting   physical exam non focal, non toxic appearing   no fever or tachycardia, no localizng signs  Labs with mild elevated LFTS,  BC - NGTD, TSH - WNL , Hepatitis Panel [facility], UA WNL  FU Ultrasound Abdomen  FU EBV/CMV serologies which are already orderd and sent   Pt advsied to FU closely with PMD   DC planning 35 mins

## 2018-07-14 NOTE — DISCHARGE NOTE ADULT - HOSPITAL COURSE
HPI:  46yo M,Patient is from [facility] living in [facility]. In June Pt had onset of chills, intermittent abdominal pain and loss of appetite. Patient was admitted to a hospital in [facility], treated with doses of ciprofloxacin and doxycycline that he didn't tolerate (unable to state why); finished iv meropenem and po Cefuroxamine course (finished 7/4). Today Pt reports feeling as if something is not right, when asked to explain further, pt indicates the he just feels frightened and nervous. Denies: CP, SOB, Palpitations GI discomfort, dysuria, hematuria. Headache, nausea and vomiting. Pt reports that he developed this anxiety a month ago and attributes it to being diagnosed with typhoid fever in [facility]. Today he has had decreased Appetite, only having breakfast. he additionally reports 1x episode of diarrhea in hospital.    Hospital course:  Patient's primary symptoms appeared to be more similar to that of anxiety, but he demonstrated an elevation of AST/ALT and bilirubin with decreased Alk P. Patient had negative hepatitis panel, EBV/CMV pending, US Abdomen pending. Patient wanted to leave [place] but spoke with friend who is a doctor (gastroenterologist) who recommended he stay for the Ultrasound before discharge... HPI:  46yo M,Patient is from [facility] living in [facility]. In June Pt had onset of chills, intermittent abdominal pain and loss of appetite. Patient was admitted to a hospital in [facility], treated with doses of ciprofloxacin and doxycycline that he didn't tolerate (unable to state why); finished iv meropenem and po Cefuroxamine course (finished 7/4). Today Pt reports feeling as if something is not right, when asked to explain further, pt indicates the he just feels frightened and nervous. Denies: CP, SOB, Palpitations GI discomfort, dysuria, hematuria. Headache, nausea and vomiting. Pt reports that he developed this anxiety a month ago and attributes it to being diagnosed with typhoid fever in [facility]. Today he has had decreased Appetite, only having breakfast. he additionally reports 1x episode of diarrhea in hospital.    Hospital course:  Patient's primary symptoms appeared to be more similar to that of anxiety, but he demonstrated an elevation of AST/ALT and bilirubin with decreased Alk P. Patient had negative hepatitis panel, EBV/CMV pending, US Abdomen pending. Patient wanted to leave [place] but spoke with friend who is a doctor (gastroenterologist) who recommended he stay for the Ultrasound before discharge demonstrated Hepatic steatosis as the likely cause of his elevated LFTs. HPI:  48yo M,Patient is from [facility] living in [facility]. In June Pt had onset of chills, intermittent abdominal pain and loss of appetite. Patient was admitted to a hospital in [facility], treated with doses of ciprofloxacin and doxycycline that he didn't tolerate (unable to state why); finished iv meropenem and po Cefuroxamine course (finished 7/4). Today Pt reports feeling as if something is not right, when asked to explain further, pt indicates the he just feels frightened and nervous. Denies: CP, SOB, Palpitations GI discomfort, dysuria, hematuria. Headache, nausea and vomiting. Pt reports that he developed this anxiety a month ago and attributes it to being diagnosed with typhoid fever in [facility]. Today he has had decreased Appetite, only having breakfast. he additionally reports 1x episode of diarrhea in hospital.    Hospital course:  Patient's primary symptoms appeared to be more similar to that of anxiety, but he demonstrated an elevation of AST/ALT and bilirubin with decreased Alk P. Patient had negative hepatitis panel, EBV/CMV pending, US Abdomen pending. Patient wanted to leave [place] but spoke with friend who is a doctor (gastroenterologist) who recommended he stay for the Ultrasound before discharge demonstrated Hepatic steatosis as the likely cause of his elevated LFTs. Patient was seen by Psychiatry before discharge and prescribed Mirazipine 15 mg QHs and given information for follow-up within the next few days.

## 2018-07-14 NOTE — DISCHARGE NOTE ADULT - CARE PLAN
Principal Discharge DX:	Elevated liver enzymes  Goal:	Follow-up with your primary care doctor  Assessment and plan of treatment:	You came to the emergency department with symptoms of uneasiness in the setting of past Typhoid Fever infection, you were found to have slightly elevated liver function tests including Bilirubin 1.6, AST/ALT  45/104, and low Alk Phos 36. You had an ultrasound of the abdomen to evaluate for cause. You had an acute hepatitis panel (negative) and EBV/CMV (pending result) ordered.  Secondary Diagnosis:	Anxiety  Goal:	Follow-up with a primary care doctor, psychiatric evaluation  Assessment and plan of treatment:	We recommend that you follow-up with a primary care physician, you can call to make an appointment at the [facility], phone number listed below, or other doctor. You may be considered for a medication to treat anxiety. We recommend that you also be further evaluated by psychiatry as referred by your primary care doctor Principal Discharge DX:	Elevated liver enzymes  Goal:	Follow-up with your primary care doctor  Assessment and plan of treatment:	You came to the emergency department with symptoms of uneasiness in the setting of past Typhoid Fever infection, you were found to have slightly elevated liver function tests including Bilirubin 1.6, AST/ALT  45/104, and low Alk Phos 36. You had an ultrasound of the abdomen to evaluate for cause. You had an acute hepatitis panel (negative), CMV (negative) and EBV demonstrating past infection. You had an ultrasound of your liver that demonstrated Hepatic steatosis, or infiltration of fat into the liver. We recommend you eat a healthy diet high in vegetables and low in saturated fats and refined processed sugars/carbohydrates. Please follow-up with a Primary Care Doctor, you may call the [facility] (phone number below) or other physician of your choice. The [facility] has also been informed of you and will reach out to you for appointment, but please call if you do not hear from them within the next 48 hours.  Secondary Diagnosis:	Anxiety  Goal:	Follow-up with a primary care doctor, psychiatric evaluation  Assessment and plan of treatment:	We recommend that you follow-up with a primary care physician, you can call to make an appointment at the [facility], phone number listed below, or other doctor. You may be considered for a medication to treat anxiety. We recommend that you also be further evaluated by psychiatry as referred by your primary care doctor Principal Discharge DX:	Elevated liver enzymes  Goal:	Follow-up with your primary care doctor  Assessment and plan of treatment:	You came to the emergency department with symptoms of uneasiness in the setting of past Typhoid Fever infection, you were found to have slightly elevated liver function tests including Bilirubin 1.6, AST/ALT  45/104, and low Alk Phos 36. You had an ultrasound of the abdomen to evaluate for cause. You had an acute hepatitis panel (negative), CMV (negative) and EBV demonstrating past infection. You had an ultrasound of your liver that demonstrated Hepatic steatosis, or infiltration of fat into the liver. We recommend you eat a healthy diet high in vegetables and low in saturated fats and refined processed sugars/carbohydrates. Please follow-up with a Primary Care Doctor, you may call the [facility] (phone number below) or other physician of your choice. The [facility] has also been informed of you and will reach out to you for appointment, but please call if you do not hear from them within the next 48 hours.  Secondary Diagnosis:	Anxiety  Goal:	Follow-up with a primary care doctor, psychiatric evaluation  Assessment and plan of treatment:	We recommend that you follow-up with a primary care physician, you can call to make an appointment at the [facility], phone number listed below, or other doctor. You may be considered for a medication to treat anxiety. We recommend that you also be further evaluated by psychiatry as referred by your primary care doctor. You were given contact information by our Psychiatry team to follow-up with them within the next few days. You can contact our [facility] at the number below, your information was given to them so they may reach out within the next few days.

## 2018-07-14 NOTE — PROGRESS NOTE ADULT - PROBLEM SELECTOR PLAN 3
- No abdominal pain. Episode of diarrhea in ED but was related to feeling of anxiety and stress. Possible IBS. Consider immodium  - Will trend LFTs. f/u US abdomen

## 2018-07-14 NOTE — DISCHARGE NOTE ADULT - MEDICATION SUMMARY - MEDICATIONS TO STOP TAKING
I will STOP taking the medications listed below when I get home from the hospital:    Zithromax Z-Janusz 250 mg oral tablet  -- 1 dose(s) by mouth once a day   -- Do not take dairy products, antacids, or iron preparations within one hour of this medication.  Finish all this medication unless otherwise directed by prescriber.

## 2018-07-14 NOTE — DISCHARGE NOTE ADULT - MEDICATION SUMMARY - MEDICATIONS TO TAKE
I will START or STAY ON the medications listed below when I get home from the hospital:    Super B Complex oral tablet  -- 1 tab(s) by mouth once a day  -- Indication: For Health maintenance I will START or STAY ON the medications listed below when I get home from the hospital:    Remeron 15 mg oral tablet  -- 1 tab(s) by mouth once a day (at bedtime)   -- It is very important that you take or use this exactly as directed.  Do not skip doses or discontinue unless directed by your doctor.  May cause drowsiness.  Alcohol may intensify this effect.  Use care when operating dangerous machinery.  Obtain medical advice before taking any non-prescription drugs as some may affect the action of this medication.    -- Indication: For Anxiety    Super B Complex oral tablet  -- 1 tab(s) by mouth once a day  -- Indication: For Health maintenance

## 2018-07-15 DIAGNOSIS — Z29.9 ENCOUNTER FOR PROPHYLACTIC MEASURES, UNSPECIFIED: ICD-10-CM

## 2018-07-15 LAB
BACTERIA BLD CULT: SIGNIFICANT CHANGE UP
BACTERIA BLD CULT: SIGNIFICANT CHANGE UP
CMV IGM FLD-ACNC: <8 AU/ML — SIGNIFICANT CHANGE UP
CMV IGM SERPL QL: NEGATIVE — SIGNIFICANT CHANGE UP
EBV EA AB TITR SER IF: POSITIVE — SIGNIFICANT CHANGE UP
EBV EA IGG SER-ACNC: POSITIVE — SIGNIFICANT CHANGE UP
EBV PATRN SPEC IB-IMP: SIGNIFICANT CHANGE UP
EBV VCA IGG AVIDITY SER QL IA: POSITIVE — SIGNIFICANT CHANGE UP
EBV VCA IGM TITR FLD: NEGATIVE — SIGNIFICANT CHANGE UP

## 2018-07-15 PROCEDURE — 76705 ECHO EXAM OF ABDOMEN: CPT | Mod: 26

## 2018-07-15 PROCEDURE — 99232 SBSQ HOSP IP/OBS MODERATE 35: CPT | Mod: GC

## 2018-07-15 RX ORDER — IBUPROFEN 200 MG
400 TABLET ORAL ONCE
Qty: 0 | Refills: 0 | Status: COMPLETED | OUTPATIENT
Start: 2018-07-15 | End: 2018-07-15

## 2018-07-15 RX ADMIN — Medication 400 MILLIGRAM(S): at 20:32

## 2018-07-15 RX ADMIN — Medication 400 MILLIGRAM(S): at 21:32

## 2018-07-15 NOTE — PROGRESS NOTE ADULT - PROBLEM SELECTOR PLAN 4
- DVT PPx: Holding pharmacologic AC as patient is ambulatory and is a low risk for a DVT  - Diet: Regular

## 2018-07-15 NOTE — PROGRESS NOTE ADULT - ATTENDING COMMENTS
Pt seen and examined by me   Pt with very vague symptoms- body ache, anxious about health, poor appetite, general feeling of ill being   Symptoms started after he was treated for Typhpoid in [facility] a few months back  Recently completed a course of Abx  Pt reports he was started on Klonopin and Citalopram, in [facility] but stopped it in 2 days as he felt it was not helping him  Tolerating PO, no nausea or vomiting   physical exam non focal, non toxic appearing   no fever or tachycardia, no localizing signs  Labs with mild elevated LFTS, which are trending down   BC - NGTD, TSH - WNL , Hepatitis Panel WWL, UA WNL  FU Ultrasound Abdomen  CMV - negative , EBV with IGg Antibody positive - likely chronic infection  Pt advised to FU closely with PMD   DC planning 35 mins . Pt seen and examined by me   Pt with very vague symptoms- body ache, anxious about health, poor appetite, general feeling of ill being   Symptoms started after he was treated for Typhpoid in [facility] a few months back  Recently completed a course of Abx  Pt reports he was started on Klonopin and Citalopram, in [facility] but stopped it in 2 days as he felt it was not helping him  Tolerating PO, no nausea or vomiting   physical exam non focal, non toxic appearing   no fever or tachycardia, no localizing signs  Labs with mild elevated LFTS, which are trending down   BC - NGTD, TSH - WNL , Hepatitis Panel WWL, UA WNL  CMV - negative , EBV with IGg Antibody positive - likely chronic infection    FU Ultrasound Abdomen, not done yet  Pt advised he can obtain ultrasound as outpt    FU closely with PMD   DC planning 35 mins . Pt seen and examined by me   Pt with very vague symptoms- body ache, anxious about health, poor appetite, general feeling of ill being   Symptoms started after he was treated for Typhpoid in [facility] a few months back  Recently completed a course of Abx  Pt reports he was started on Klonopin and Citalopram, in [facility] but stopped it in 2 days as he felt it was not helping him  Tolerating PO, no nausea or vomiting   physical exam non focal, non toxic appearing   no fever or tachycardia, no localizing signs  Labs with mild elevated LFTS, which are trending down   BC - NGTD, TSH - WNL , Hepatitis Panel WWL, UA WNL  CMV - negative , EBV with IGg Antibody positive - likely convalescent vs  chronic infection    FU Ultrasound Abdomen, not done yet  Discussed with pt and his sister at length  Pt advised he can obtain ultrasound as outpt   Pt initially agreeable to dc but he spoke with his out pt GI who insisted that they should obtain ultrasound inpt    FU closely with PMD   DC planning 35 mins . Pt seen and examined by me   Pt with very vague symptoms- body ache, anxious about health, poor appetite, general feeling of ill being   Symptoms started after he was treated for Typhpoid in [facility] a few months back  Recently completed a course of Abx  Pt reports he was started on Klonopin and Citalopram, in [facility] but stopped it in 2 days as he felt it was not helping him  Tolerating PO, no nausea or vomiting   physical exam non focal, non toxic appearing   no fever or tachycardia, no localizing signs  Labs with mild elevated LFTS, which are trending down   BC - NGTD, TSH - WNL , Hepatitis Panel WWL, UA WNL  CMV - negative , EBV with IGg Antibody positive - likely convalescent vs  chronic infection    FU Ultrasound Abdomen, not done yet  Discussed with pt and his sister at length  Pt advised he can obtain ultrasound as outpt   Pt initially agreeable to dc but he spoke with his out pt GI who insisted that they should obtain ultrasound inpt   pt advsied to FU with Psychiatry as outpt   FU closely with PMD   DC planning 35 mins . Pt seen and examined by me   Pt with very vague symptoms- body ache, anxious about health, poor appetite, general feeling of ill being   Symptoms started after he was treated for Typhpoid in [facility] a few months back  Recently completed a course of Abx  Pt reports he was started on Klonopin and Citalopram, in [facility] but stopped it in 2 days as he felt it was not helping him  Tolerating PO, no nausea or vomiting   physical exam non focal, non toxic appearing   no fever or tachycardia, no localizing signs  Labs with mild elevated LFTS, which are trending down   BC - NGTD, TSH - WNL , Hepatitis Panel WWL, UA WNL  CMV - negative , EBV with IGg Antibody positive - likely convalescent vs  chronic infection    FU Ultrasound Abdomen, not done yet  Discussed with pt and his sister at length  Pt advised he can obtain ultrasound as outpt   Pt initially agreeable to dc but he spoke with his out pt GI who insisted that they should obtain ultrasound inpt   Family now want to do the ultrasound as inpt as per outpt GI recommendations    pt advsied to FU with Psychiatry as outpt   FU closely with PMD   DC planning 35 mins .

## 2018-07-15 NOTE — PROGRESS NOTE ADULT - PROBLEM SELECTOR PLAN 1
- Patient reports a recent history of Typhoid fever. Suspect that his recent illness is currently resolved as he has no complaints of nausea, vomiting, diarrhea, or abdominal pain. He does endorse some decreased appetite, but unclear if this is related to his anxiety   - Patient has remained afebrile and without leukocytosis. Will continue to monitor off antibiotics  - Monitor CBC and fever curve

## 2018-07-15 NOTE — CHART NOTE - NSCHARTNOTEFT_GEN_A_CORE
There was a tentative plan to discharge patient later today. However, per patient's sister's request, spoke with patient's Gastroenterologist (Dr. Rajinder Perdue) who requested that patient's GI workup, such as abdominal U/S, be completed while inpatient. He also stated that patient would likely benefit from a Psychiatry evaluation. Contacted U/S to see if patient can be seen today. Will also plan for a possible Psychiatry consult in the AM. Informed patient and his sister that I spoke with Dr. Perdue and his request to complete the GI workup. They stated understanding and were in agreement with this plan.

## 2018-07-15 NOTE — PROGRESS NOTE ADULT - PROBLEM SELECTOR PLAN 2
- Patient endorses increased anxiety lately. He does state that he has some increased stressors over the past year, such as taking care of a baby, work, and illness in the family. No SI/HI  - Will consider Psych consult in the AM if patient still hospitalized. Otherwise, will plan for close follow-up with Psychiatry after discharge

## 2018-07-15 NOTE — PROGRESS NOTE ADULT - PROBLEM SELECTOR PLAN 3
- Patient noted to have a mild transaminitis on presentation of unclear etiology. He has no associated nausea, vomiting, or abdominal pain. LFTs have currently been stable.  - Abdominal U/S pending  - Monitor LFTs

## 2018-07-15 NOTE — PROGRESS NOTE ADULT - SUBJECTIVE AND OBJECTIVE BOX
Patient is a 47y old  Male who presents with a chief complaint of Anxiety (2018 16:46)      SUBJECTIVE / OVERNIGHT EVENTS:    MEDICATIONS  (STANDING):    MEDICATIONS  (PRN):        CAPILLARY BLOOD GLUCOSE        I&O's Summary    Vital Signs Last 24 Hrs  T(C): 36.7 (15 Jul 2018 06:06), Max: 37.1 (2018 06:38)  T(F): 98 (15 Jul 2018 06:06), Max: 98.8 (2018 14:42)  HR: 70 (15 Jul 2018 06:06) (70 - 96)  BP: 123/82 (15 Jul 2018 06:06) (108/73 - 152/99)  BP(mean): --  RR: 18 (15 Jul 2018 06:06) (16 - 18)  SpO2: 100% (15 Jul 2018 06:06) (97% - 100%)    PHYSICAL EXAM:  GENERAL: NAD, well-developed  HEAD:  Atraumatic, Normocephalic  EYES: EOMI, PERRLA, conjunctiva and sclera clear  NECK: Supple, No JVD  CHEST/LUNG: Clear to auscultation bilaterally; No wheeze  HEART: Regular rate and rhythm; No murmurs, rubs, or gallops  ABDOMEN: Soft, Nontender, Nondistended; Bowel sounds present  EXTREMITIES:  2+ Peripheral Pulses, No clubbing, cyanosis, or edema  PSYCH: AAOx3  NEUROLOGY: non-focal  SKIN: No rashes or lesions    LABS:                        15.5   7.02  )-----------( 258      ( 2018 07:00 )             45.8     07-14    140  |  101  |  14  ----------------------------<  102<H>  3.7   |  24  |  0.91    Ca    9.8      2018 07:00  Phos  4.2     07-14  Mg     2.0     -14    TPro  7.3  /  Alb  4.3  /  TBili  1.6<H>  /  DBili  0.2  /  AST  47<H>  /  ALT  113<H>  /  AlkPhos  36<L>  07-14          Urinalysis Basic - ( 2018 20:24 )    Color: YELLOW / Appearance: CLEAR / S.018 / pH: 6.0  Gluc: NEGATIVE / Ketone: MODERATE  / Bili: NEGATIVE / Urobili: NORMAL mg/dL   Blood: NEGATIVE / Protein: NEGATIVE mg/dL / Nitrite: NEGATIVE   Leuk Esterase: NEGATIVE / RBC: 5-10 / WBC 2-5   Sq Epi: OCC / Non Sq Epi: x / Bacteria: x        RADIOLOGY & ADDITIONAL TESTS:    Imaging Personally Reviewed:    Consultant(s) Notes Reviewed:      Care Discussed with Consultants/Other Providers: Patient is a 47y old  Male who presents with a chief complaint of Anxiety (2018 16:46)    SUBJECTIVE / OVERNIGHT EVENTS:  No acute events overnight. Patient seen and examined this AM. Patient reports that he feels well overall this AM. He endorses a somewhat poor appetite and states that he can still become occasionally anxious. However, he denies any chest pain, shortness of breath, nausea, vomiting, diarrhea, or abdominal pain. Patient was afebrile overnight.    MEDICATIONS  (STANDING):    MEDICATIONS  (PRN):        CAPILLARY BLOOD GLUCOSE        I&O's Summary    Vital Signs Last 24 Hrs  T(C): 36.7 (15 Jul 2018 06:06), Max: 37.1 (2018 06:38)  T(F): 98 (15 Jul 2018 06:06), Max: 98.8 (2018 14:42)  HR: 70 (15 Jul 2018 06:06) (70 - 96)  BP: 123/82 (15 Jul 2018 06:06) (108/73 - 152/99)  BP(mean): --  RR: 18 (15 Jul 2018 06:06) (16 - 18)  SpO2: 100% (15 Jul 2018 06:06) (97% - 100%)    PHYSICAL EXAM:  GENERAL: NAD  HEENT: NC/AT, MMM  NECK: Supple  CHEST/LUNG: CTAB; No wheeze or rhonchi appreciated, Respirations nonlabored  HEART: RRR; +S1/S2  ABDOMEN: +BS, Soft, NT, No rigidity  GENITOURINARY: No Hill, No suprapubic TTP  EXTREMITIES: No peripheral edema  NEUROLOGY: A+Ox3, Nonfocal  SKIN: Warm and dry  PSYCH: Slightly flat affect    LABS:                        15.5   7.02  )-----------( 258      ( 2018 07:00 )             45.8     07-14    140  |  101  |  14  ----------------------------<  102<H>  3.7   |  24  |  0.91    Ca    9.8      2018 07:00  Phos  4.2     07-14  Mg     2.0     07-14    TPro  7.3  /  Alb  4.3  /  TBili  1.6<H>  /  DBili  0.2  /  AST  47<H>  /  ALT  113<H>  /  AlkPhos  36<L>  07-14          Urinalysis Basic - ( 2018 20:24 )    Color: YELLOW / Appearance: CLEAR / S.018 / pH: 6.0  Gluc: NEGATIVE / Ketone: MODERATE  / Bili: NEGATIVE / Urobili: NORMAL mg/dL   Blood: NEGATIVE / Protein: NEGATIVE mg/dL / Nitrite: NEGATIVE   Leuk Esterase: NEGATIVE / RBC: 5-10 / WBC 2-5   Sq Epi: OCC / Non Sq Epi: x / Bacteria: x        RADIOLOGY & ADDITIONAL TESTS:    Imaging Personally Reviewed:    Consultant(s) Notes Reviewed:      Care Discussed with Consultants/Other Providers:

## 2018-07-16 VITALS
SYSTOLIC BLOOD PRESSURE: 115 MMHG | DIASTOLIC BLOOD PRESSURE: 86 MMHG | RESPIRATION RATE: 17 BRPM | OXYGEN SATURATION: 97 % | TEMPERATURE: 99 F | HEART RATE: 82 BPM

## 2018-07-16 DIAGNOSIS — F32.9 MAJOR DEPRESSIVE DISORDER, SINGLE EPISODE, UNSPECIFIED: ICD-10-CM

## 2018-07-16 LAB
ALBUMIN SERPL ELPH-MCNC: 4.2 G/DL — SIGNIFICANT CHANGE UP (ref 3.3–5)
ALP SERPL-CCNC: 31 U/L — LOW (ref 40–120)
ALT FLD-CCNC: 121 U/L — HIGH (ref 4–41)
APTT BLD: 28.2 SEC — SIGNIFICANT CHANGE UP (ref 27.5–37.4)
AST SERPL-CCNC: 51 U/L — HIGH (ref 4–40)
BILIRUB DIRECT SERPL-MCNC: 0.3 MG/DL — HIGH (ref 0.1–0.2)
BILIRUB SERPL-MCNC: 2.3 MG/DL — HIGH (ref 0.2–1.2)
BUN SERPL-MCNC: 13 MG/DL — SIGNIFICANT CHANGE UP (ref 7–23)
CALCIUM SERPL-MCNC: 10.1 MG/DL — SIGNIFICANT CHANGE UP (ref 8.4–10.5)
CHLORIDE SERPL-SCNC: 100 MMOL/L — SIGNIFICANT CHANGE UP (ref 98–107)
CO2 SERPL-SCNC: 26 MMOL/L — SIGNIFICANT CHANGE UP (ref 22–31)
CREAT SERPL-MCNC: 1 MG/DL — SIGNIFICANT CHANGE UP (ref 0.5–1.3)
GLUCOSE SERPL-MCNC: 106 MG/DL — HIGH (ref 70–99)
INR BLD: 1.04 — SIGNIFICANT CHANGE UP (ref 0.88–1.17)
MAGNESIUM SERPL-MCNC: 1.9 MG/DL — SIGNIFICANT CHANGE UP (ref 1.6–2.6)
PHOSPHATE SERPL-MCNC: 5 MG/DL — HIGH (ref 2.5–4.5)
POTASSIUM SERPL-MCNC: 4 MMOL/L — SIGNIFICANT CHANGE UP (ref 3.5–5.3)
POTASSIUM SERPL-SCNC: 4 MMOL/L — SIGNIFICANT CHANGE UP (ref 3.5–5.3)
PROT SERPL-MCNC: 7.4 G/DL — SIGNIFICANT CHANGE UP (ref 6–8.3)
PROTHROM AB SERPL-ACNC: 11.6 SEC — SIGNIFICANT CHANGE UP (ref 9.8–13.1)
SODIUM SERPL-SCNC: 140 MMOL/L — SIGNIFICANT CHANGE UP (ref 135–145)

## 2018-07-16 PROCEDURE — 99239 HOSP IP/OBS DSCHRG MGMT >30: CPT

## 2018-07-16 RX ORDER — MIRTAZAPINE 45 MG/1
1 TABLET, ORALLY DISINTEGRATING ORAL
Qty: 30 | Refills: 0 | OUTPATIENT
Start: 2018-07-16 | End: 2018-08-14

## 2018-07-16 NOTE — BEHAVIORAL HEALTH ASSESSMENT NOTE - CASE SUMMARY
Assessment: 46 yo man from [facility] in  [facility] on vacation  was brought to the hospital on 7/13 because pt felt frightened and nervous, pt thought it was related to his recent Typhoid fever though no other signs of typhoid present. Pt meets criteria for Major Depressive Disorder, including depression, anhednoia, loss of concentration, and in particular complains of poor sleep and appetite which are primary issues to him and family. No current or prior SI/I/P. Pt agrees to try mirtazapine 15mg with the understanding that he must have immediate outpatient follow up, agrees to f/u with [facility] outpatient services within 1-2 weeks, agrees to f/u with outpatient Medicine to track LFT’s which are downtrending. Risks of mirtazapine discussed including oversedation, anticholingeric toxidrome, contribution to LFT’s (though rare). 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   -	pt can be discharged on mirtazapine 15mg qHS standing with f/u with [facility] psych as above  -	Pt agrees to call/ go to ED if symptoms worsen or sfx occur, or go to [facility] walk in clinic as above.

## 2018-07-16 NOTE — BEHAVIORAL HEALTH ASSESSMENT NOTE - HPI (INCLUDE ILLNESS QUALITY, SEVERITY, DURATION, TIMING, CONTEXT, MODIFYING FACTORS, ASSOCIATED SIGNS AND SYMPTOMS)
48 yo man  from [facility] in  [facility] [place] on vacation  was brought to the hospital on 7/13  because pt felt  frightened and nervous. As per patient everything start In June  when he was diagnosed with typhoid in [facility]. Patient was admitted to a hospital in [facility], treated with doses of ciprofloxacin and doxycycline that he didn't tolerate, he started getting anxious clonazepam and escitalopram was prescribed at that time.   CP, SOB, Palpitations GI discomfort, dysuria, hematuria. Headache, nausea and vomiting. Pt reports that he developed this anxiety a month ago and attributes it to being diagnosed with typhoid fever in [facility]. Today he has had decreased Appetite, only having breakfast. he additionally reports 1x episode of diarrhea in hospital. 46 yo man  from [facility] in  [facility] on vacation  was brought to the hospital on 7/13  because pt felt  frightened and nervous. As per patient everything start In June  when he was diagnosed with typhoid in [facility]. Patient was admitted to a hospital in [facility], treated with doses of ciprofloxacin and doxycycline that he didn't tolerate, pt started getting anxious the doctors in the hospital told him it not  clonazepam and citalopram were prescribed at that time.   CP, SOB, Palpitations GI discomfort, dysuria, hematuria. Headache, nausea and vomiting. Pt reports that he developed this anxiety a month ago and attributes it to being diagnosed with typhoid fever in [facility]. Today he has had decreased Appetite, only having breakfast. he additionally reports 1x episode of diarrhea in hospital. 46 yo man  from [facility] in  [facility] on vacation  was brought to the hospital on 7/13  because pt felt  frightened and nervous. As per patient everything start in  June  when he was diagnosed with typhoid in [facility]. Patient was admitted to a hospital in [facility] with complaints of  chills, intermittent abdominal pain and loss of appetite. Pt was diagnosed with Typhoid and was  , treated with doses of ciprofloxacin and doxycycline that he didn't tolerate, pt started getting anxious , unable to sleep well, woke up at night frightening, with chest pain, palpitation. Doctors in the hospital told him that his symptoms weren't related to the typhoid and  he should see a psychiatrist upon discharge.   Pt went to see a psychiatrist  after discharge from the hospital who prescribed him clonazepam and citalopram. pt states took only one dose of each of the medications, the next day pt was feeling worse, fell from his bed, pt was brought back to the hospital where he was rehydrated with NS and was place on antibiotic, Ativan for two days and was discharged home.   Today, pt states his symptoms are getting worse, pt c/o feeling tired, loss of appetite,  little interest in doing thing "I feel terrible"  Pt 's sister Nicki states that every time pt eat, he become nauseated and start getting anxious. Pt denies any acute unexpected event in his life, pt states having a wife and three children and has been in good relationship with them what the sister confirm. pt states sometime woke up at night frightening for a couple of month  but because he has a 2 year old baby  at home who woke him  up at night, he thought it was because of that   pt denies any SI/HI. 48 yo man from [facility] in  [facility] on vacation  was brought to the hospital on 7/13 because pt felt  frightened and nervous. As per patient everything start in  June  when he was diagnosed with typhoid in [facility]. Patient was admitted to a hospital in [facility] with complaints of chills, intermittent abdominal pain and loss of appetite. Pt was diagnosed with Typhoid and was, treated with doses of ciprofloxacin and doxycycline that he didn't tolerate, pt started getting anxious , unable to sleep well, woke up at night frightening, with chest pain, palpitation. Doctors in the hospital told him that his symptoms weren't related to the typhoid and  he should see a psychiatrist upon discharge.     Pt went to see a psychiatrist  after discharge from the hospital who prescribed him clonazepam and citalopram. pt states took only one dose of each of the medications, the next day pt was feeling worse, fell from his bed, pt was brought back to the hospital where he was rehydrated with NS and was place on antibiotic, Ativan for two days and was discharged home.   Today, pt states his symptoms are getting worse, pt c/o feeling tired, loss of appetite,  little interest in doing thing "I feel terrible"  Pt 's sister Nicki states that every time pt eat, he become nauseated and start getting anxious. Pt denies any acute unexpected event in his life, pt states having a wife and three children and has been in good relationship with them what the sister confirm. pt states sometime woke up at night frightening for a couple of month  but because he has a 2 year old baby  at home who woke him  up at night, he thought it was because of that   pt denies any SI/HI.

## 2018-07-16 NOTE — PROGRESS NOTE ADULT - PROBLEM SELECTOR PLAN 3
- Patient noted to have a mild transaminitis on presentation of unclear etiology. He has no associated nausea, vomiting, or abdominal pain. LFTs have currently been stable.  - Abdominal U/S pending  - Monitor LFTs Patient reports a recent history of Typhoid fever with extensive tx at a hospital in [facility]. He currently has no symptoms consistent with Typhoid infection.   - Patient has remained afebrile and without leukocytosis, safe to discharge

## 2018-07-16 NOTE — BEHAVIORAL HEALTH ASSESSMENT NOTE - NSBHCHARTREVIEWINVESTIGATE_PSY_A_CORE FT
< from: 12 Lead ECG (07.13.18 @ 17:51) >      Ventricular Rate 75 BPM    Atrial Rate 75 BPM    P-R Interval 156 ms    QRS Duration 98 ms    Q-T Interval 380 ms    QTC Calculation(Bezet) 424 ms    P Axis 51 degrees    R Axis -39 degrees    T Axis 39 degrees    Diagnosis Line Normal sinus rhythm  Left axis deviation  Abnormal ECG    < end of copied text >

## 2018-07-16 NOTE — PROGRESS NOTE ADULT - PROBLEM SELECTOR PLAN 1
Patient reports a recent history of Typhoid fever with extensive tx at a hospital in [facility]. He currently has no symptoms consistent with Typhoid infection.   - Patient has remained afebrile and without leukocytosis, safe to discharge Viral work-up negative to date, US RUQ demonstrating hepatic steatosis.   - Outpatient follow-up with PMD

## 2018-07-16 NOTE — BEHAVIORAL HEALTH ASSESSMENT NOTE - NSBHCHARTREVIEWLAB_PSY_A_CORE FT
07-16    140  |  100  |  13  ----------------------------<  106<H>  4.0   |  26  |  1.00    Ca    10.1      16 Jul 2018 06:25  Phos  5.0     07-16  Mg     1.9     07-16    TPro  7.4  /  Alb  4.2  /  TBili  2.3<H>  /  DBili  0.3<H>  /  AST  51<H>  /  ALT  121<H>  /  AlkPhos  31<L>  07-16

## 2018-07-16 NOTE — BEHAVIORAL HEALTH ASSESSMENT NOTE - NSBHCHARTREVIEWIMAGING_PSY_A_CORE FT
< from: US Abdomen Limited (07.15.18 @ 17:02) >    XAM:  US ABDOMEN LIMITED        PROCEDURE DATE:  Jul 15 2018         INTERPRETATION:  CLINICAL INFORMATION: 47-year-old man with increased   LFTs.    COMPARISON: None available.    TECHNIQUE: Sonography of the right upper quadrant.     FINDINGS:    Liver: Hepatic steatosis.    Bile ducts: Normal caliber.     Gallbladder: Within normal limits.        Pancreas: Visualized portions are within normal limits.    Right kidney: 9.4 cm. No hydronephrosis.    Ascites: None.    IVC: Visualized portions are within normal limits.    IMPRESSION:     Hepatic steatosis.            < end of copied text >

## 2018-07-16 NOTE — BEHAVIORAL HEALTH ASSESSMENT NOTE - NSBHCHARTREVIEWVS_PSY_A_CORE FT
ICU Vital Signs Last 24 Hrs  T(C): 36.6 (16 Jul 2018 06:32), Max: 36.9 (15 Jul 2018 20:57)  T(F): 97.9 (16 Jul 2018 06:32), Max: 98.4 (15 Jul 2018 20:57)  HR: 76 (16 Jul 2018 06:32) (72 - 81)  BP: 133/96 (16 Jul 2018 06:32) (122/83 - 133/96)  BP(mean): --  ABP: --  ABP(mean): --  RR: 18 (16 Jul 2018 06:32) (18 - 18)  SpO2: 100% (16 Jul 2018 06:32) (97% - 100%)

## 2018-07-16 NOTE — PROGRESS NOTE ADULT - ASSESSMENT
47 Y Male with subjective Hx of typhoid fever, coming into hospital with symptoms of nervousness anxiety in the setting of recent typhoid infection.
47M PMH of ?recent typhoid fever infection who presented to the hospital symptoms of anxiety, found to have a transaminitis.
Patient is a 46 y/o M w/ a PMHx of ? recent typhoid fever infection who presented to the hospital symptoms of anxiety, found to have a transaminitis.

## 2018-07-16 NOTE — BEHAVIORAL HEALTH ASSESSMENT NOTE - FAMILY DETAILS
Lives in [facility] with wife and 3 children , [place]  with brothers and sisters Lives in [facility] with wife and 3 children , [place]  with brothers and sisters

## 2018-07-16 NOTE — BEHAVIORAL HEALTH ASSESSMENT NOTE - NSBHCONSULTFOLLOWAFTERCARE_PSY_A_CORE FT
- patient to f/u with [facility] outpatient services [phone], if appointment taking >1-2 weeks then patient should go to [facility] at [phone]

## 2018-07-16 NOTE — BEHAVIORAL HEALTH ASSESSMENT NOTE - NSBHCONSULTMEDS_PSY_A_CORE FT
Pt has both symptoms anxiety and depression. Pt was started on clonazepam and citalopram a couple week prior but  didn't tolerate them.  Pt is not on any medication right now.  Start pt on Remeron 15mg discussed side effect of the medication with pt   discussed with  primary team, pt will be discharged soon  Advise pt  to follow with outpatient psych in a few days,  outpt phone number was giving to pt. Pt has both symptoms anxiety and depression. Pt was started on clonazepam and citalopram a couple week prior but didn't tolerate them.  Pt is not on any medication right now. Does not wish to start benzo, though minimal historical risk of addiction.  Please start pt on Remeron 15mg discussed side effect of the medication with patient, can be discharged on 2 week supply of med  Discussed with primary team, pt will be discharged soon  Advise pt  to follow with outpatient psych in a few days, outpt phone number given to patient, patient can call [phone] for regular outpt appts and [phone] for Walk In clinic for more immediate appts.

## 2018-07-16 NOTE — PROGRESS NOTE ADULT - PROBLEM SELECTOR PLAN 2
Patient endorses symptoms of significant anxiety and nervousness recently. increased anxiety lately. He does state that he has some increased stressors over the past year, such as taking care of a baby, work, and illness in the family. No SI/HI  - Will consider Psych consult in the AM if patient still hospitalized. Otherwise, will plan for close follow-up with Psychiatry after discharge Patient endorses symptoms of significant anxiety and nervousness recently. increased anxiety lately. He does state that he has some increased stressors over the past year, such as taking care of a baby, work, and illness in the family. No SI/HI  - F/u Psych consult to initiate tx and outpatient follow-up.

## 2018-07-16 NOTE — BEHAVIORAL HEALTH ASSESSMENT NOTE - SUMMARY
46 yo man  from [facility] in  [facility] on vacation  recently treated with antibiotic  for typhoid  was brought to the hospital on 7/13  because pt felt  frightened and nervous.

## 2018-07-16 NOTE — PROGRESS NOTE ADULT - ATTENDING COMMENTS
Patient seen and examined. Elevated liver transaminases stable. Abdominal US revealing hepatic steatosis which is likely source of abnormal liver tests. Pt remains asymptomatic. Counseled pt on lifestyle modifications promoting exercise and healthy diet. Evaluated by psych while inpatient. Agree with starting pt on Remeron 15mg for anxiety depression. Otherwise pt with no acute issues warranting inpatient psych admission. Pt to f/u as an outpatient with psych  Patient medically stable to be discharged home to f/u with PCP in about 1-2 weeks and outpatient psych. Pt expressed understanding and was in agreement with the plan.  Spent 35 min coordinating discharge plan and counseling pt on his medical condition and care

## 2018-07-16 NOTE — PROGRESS NOTE ADULT - SUBJECTIVE AND OBJECTIVE BOX
Patient is a 47y old  Male who presents with a chief complaint of Anxiety (14 Jul 2018 16:46)      SUBJECTIVE / OVERNIGHT EVENTS:    MEDICATIONS  (STANDING):    MEDICATIONS  (PRN):        CAPILLARY BLOOD GLUCOSE        I&O's Summary      PHYSICAL EXAM:  GENERAL: NAD, well-developed  HEAD:  Atraumatic, Normocephalic  EYES: EOMI, PERRLA, conjunctiva and sclera clear  NECK: Supple, No JVD  CHEST/LUNG: Clear to auscultation bilaterally; No wheeze  HEART: Regular rate and rhythm; No murmurs, rubs, or gallops  ABDOMEN: Soft, Nontender, Nondistended; Bowel sounds present  EXTREMITIES:  2+ Peripheral Pulses, No clubbing, cyanosis, or edema  PSYCH: AAOx3  NEUROLOGY: non-focal  SKIN: No rashes or lesions    LABS:    07-16    140  |  100  |  13  ----------------------------<  106<H>  4.0   |  26  |  1.00    Ca    10.1      16 Jul 2018 06:25  Phos  5.0     07-16  Mg     1.9     07-16    TPro  7.4  /  Alb  4.2  /  TBili  2.3<H>  /  DBili  0.3<H>  /  AST  51<H>  /  ALT  121<H>  /  AlkPhos  31<L>  07-16    PT/INR - ( 16 Jul 2018 06:25 )   PT: 11.6 SEC;   INR: 1.04          PTT - ( 16 Jul 2018 06:25 )  PTT:28.2 SEC          RADIOLOGY & ADDITIONAL TESTS:    Imaging Personally Reviewed:    Consultant(s) Notes Reviewed:      Care Discussed with Consultants/Other Providers: Patient is a 47y old  Male who presents with a chief complaint of Anxiety (14 Jul 2018 16:46)      SUBJECTIVE / OVERNIGHT EVENTS: No events overnight. Patient continues to have symptoms of nervousness.    MEDICATIONS  (STANDING):    MEDICATIONS  (PRN):        CAPILLARY BLOOD GLUCOSE        I&O's Summary      PHYSICAL EXAM:  GENERAL: NAD, well-developed  HEAD:  Atraumatic, Normocephalic  EYES: EOMI, PERRLA  NECK: Supple, No JVD  CHEST/LUNG: Clear to auscultation bilaterally; No wheeze  HEART: Regular rate and rhythm; No murmurs, rubs, or gallops  ABDOMEN: Soft, Nontender, Nondistended; Bowel sounds present  EXTREMITIES:  2+ Peripheral Pulses, No clubbing, cyanosis, or edema  PSYCH: Flat affect, depressed affect  NEUROLOGY: non-focal  SKIN: No rashes or lesions    LABS:    07-16    140  |  100  |  13  ----------------------------<  106<H>  4.0   |  26  |  1.00    Ca    10.1      16 Jul 2018 06:25  Phos  5.0     07-16  Mg     1.9     07-16    TPro  7.4  /  Alb  4.2  /  TBili  2.3<H>  /  DBili  0.3<H>  /  AST  51<H>  /  ALT  121<H>  /  AlkPhos  31<L>  07-16    PT/INR - ( 16 Jul 2018 06:25 )   PT: 11.6 SEC;   INR: 1.04          PTT - ( 16 Jul 2018 06:25 )  PTT:28.2 SEC          RADIOLOGY & ADDITIONAL TESTS:    Imaging Personally Reviewed:    < from: US Abdomen Limited (07.15.18 @ 17:02) >  INTERPRETATION:  CLINICAL INFORMATION: 47-year-old man with increased   LFTs.    COMPARISON: None available.    TECHNIQUE: Sonography of the right upper quadrant.     FINDINGS:    Liver: Hepatic steatosis.    Bile ducts: Normal caliber.     Gallbladder: Within normal limits.        Pancreas: Visualized portions are within normal limits.    Right kidney: 9.4 cm. No hydronephrosis.    Ascites: None.    IVC: Visualized portions are within normal limits.    IMPRESSION:     Hepatic steatosis.    < end of copied text > Patient is a 47y old  Male who presents with a chief complaint of Anxiety (14 Jul 2018 16:46)      SUBJECTIVE / OVERNIGHT EVENTS: No events overnight. Patient continues to have symptoms of nervousness, no abdominal pain.    MEDICATIONS  (STANDING):    MEDICATIONS  (PRN):        CAPILLARY BLOOD GLUCOSE        I&O's Summary      PHYSICAL EXAM:  GENERAL: NAD, well-developed  HEAD:  Atraumatic, Normocephalic  EYES: EOMI, PERRLA  NECK: Supple, No JVD  CHEST/LUNG: Clear to auscultation bilaterally; No wheeze  HEART: Regular rate and rhythm; No murmurs, rubs, or gallops  ABDOMEN: Soft, Nontender, Nondistended; Bowel sounds present  EXTREMITIES:  2+ Peripheral Pulses, No clubbing, cyanosis, or edema  PSYCH: Flat affect, depressed affect  NEUROLOGY: non-focal  SKIN: No rashes or lesions    LABS:    07-16    140  |  100  |  13  ----------------------------<  106<H>  4.0   |  26  |  1.00    Ca    10.1      16 Jul 2018 06:25  Phos  5.0     07-16  Mg     1.9     07-16    TPro  7.4  /  Alb  4.2  /  TBili  2.3<H>  /  DBili  0.3<H>  /  AST  51<H>  /  ALT  121<H>  /  AlkPhos  31<L>  07-16    PT/INR - ( 16 Jul 2018 06:25 )   PT: 11.6 SEC;   INR: 1.04          PTT - ( 16 Jul 2018 06:25 )  PTT:28.2 SEC          RADIOLOGY & ADDITIONAL TESTS:    Imaging Personally Reviewed:    < from: US Abdomen Limited (07.15.18 @ 17:02) >  INTERPRETATION:  CLINICAL INFORMATION: 47-year-old man with increased   LFTs.    COMPARISON: None available.    TECHNIQUE: Sonography of the right upper quadrant.     FINDINGS:    Liver: Hepatic steatosis.    Bile ducts: Normal caliber.     Gallbladder: Within normal limits.        Pancreas: Visualized portions are within normal limits.    Right kidney: 9.4 cm. No hydronephrosis.    Ascites: None.    IVC: Visualized portions are within normal limits.    IMPRESSION:     Hepatic steatosis.    < end of copied text >

## 2018-08-13 ENCOUNTER — EMERGENCY (EMERGENCY)
Facility: HOSPITAL | Age: 48
LOS: 1 days | Discharge: HOME | End: 2018-08-13
Attending: EMERGENCY MEDICINE | Admitting: EMERGENCY MEDICINE
Payer: MEDICAID

## 2018-08-13 VITALS
HEART RATE: 121 BPM | SYSTOLIC BLOOD PRESSURE: 155 MMHG | OXYGEN SATURATION: 98 % | DIASTOLIC BLOOD PRESSURE: 118 MMHG | RESPIRATION RATE: 18 BRPM | TEMPERATURE: 98 F

## 2018-08-13 VITALS
RESPIRATION RATE: 17 BRPM | SYSTOLIC BLOOD PRESSURE: 148 MMHG | HEART RATE: 82 BPM | OXYGEN SATURATION: 100 % | DIASTOLIC BLOOD PRESSURE: 83 MMHG

## 2018-08-13 PROBLEM — A01.00 TYPHOID FEVER, UNSPECIFIED: Chronic | Status: ACTIVE | Noted: 2018-07-13

## 2018-08-13 LAB
ALBUMIN SERPL ELPH-MCNC: 4.9 G/DL — SIGNIFICANT CHANGE UP (ref 3.3–5)
ALP SERPL-CCNC: 35 U/L — LOW (ref 40–120)
ALT FLD-CCNC: 68 U/L — HIGH (ref 4–41)
APAP SERPL-MCNC: < 15 UG/ML — LOW (ref 15–25)
AST SERPL-CCNC: 29 U/L — SIGNIFICANT CHANGE UP (ref 4–40)
BASOPHILS # BLD AUTO: 0.01 K/UL — SIGNIFICANT CHANGE UP (ref 0–0.2)
BASOPHILS NFR BLD AUTO: 0.1 % — SIGNIFICANT CHANGE UP (ref 0–2)
BILIRUB SERPL-MCNC: 1.7 MG/DL — HIGH (ref 0.2–1.2)
BUN SERPL-MCNC: 12 MG/DL — SIGNIFICANT CHANGE UP (ref 7–23)
CALCIUM SERPL-MCNC: 10.4 MG/DL — SIGNIFICANT CHANGE UP (ref 8.4–10.5)
CHLORIDE SERPL-SCNC: 103 MMOL/L — SIGNIFICANT CHANGE UP (ref 98–107)
CO2 SERPL-SCNC: 26 MMOL/L — SIGNIFICANT CHANGE UP (ref 22–31)
CREAT SERPL-MCNC: 0.81 MG/DL — SIGNIFICANT CHANGE UP (ref 0.5–1.3)
EOSINOPHIL # BLD AUTO: 0 K/UL — SIGNIFICANT CHANGE UP (ref 0–0.5)
EOSINOPHIL NFR BLD AUTO: 0 % — SIGNIFICANT CHANGE UP (ref 0–6)
ETHANOL BLD-MCNC: < 10 MG/DL — SIGNIFICANT CHANGE UP
GLUCOSE SERPL-MCNC: 133 MG/DL — HIGH (ref 70–99)
HCT VFR BLD CALC: 45.7 % — SIGNIFICANT CHANGE UP (ref 39–50)
HGB BLD-MCNC: 15.5 G/DL — SIGNIFICANT CHANGE UP (ref 13–17)
IMM GRANULOCYTES # BLD AUTO: 0.03 # — SIGNIFICANT CHANGE UP
IMM GRANULOCYTES NFR BLD AUTO: 0.3 % — SIGNIFICANT CHANGE UP (ref 0–1.5)
LYMPHOCYTES # BLD AUTO: 1.42 K/UL — SIGNIFICANT CHANGE UP (ref 1–3.3)
LYMPHOCYTES # BLD AUTO: 14.9 % — SIGNIFICANT CHANGE UP (ref 13–44)
MCHC RBC-ENTMCNC: 27.7 PG — SIGNIFICANT CHANGE UP (ref 27–34)
MCHC RBC-ENTMCNC: 33.9 % — SIGNIFICANT CHANGE UP (ref 32–36)
MCV RBC AUTO: 81.6 FL — SIGNIFICANT CHANGE UP (ref 80–100)
MONOCYTES # BLD AUTO: 0.52 K/UL — SIGNIFICANT CHANGE UP (ref 0–0.9)
MONOCYTES NFR BLD AUTO: 5.4 % — SIGNIFICANT CHANGE UP (ref 2–14)
NEUTROPHILS # BLD AUTO: 7.57 K/UL — HIGH (ref 1.8–7.4)
NEUTROPHILS NFR BLD AUTO: 79.3 % — HIGH (ref 43–77)
NRBC # FLD: 0 — SIGNIFICANT CHANGE UP
PLATELET # BLD AUTO: 266 K/UL — SIGNIFICANT CHANGE UP (ref 150–400)
PMV BLD: 10.5 FL — SIGNIFICANT CHANGE UP (ref 7–13)
POTASSIUM SERPL-MCNC: 3.9 MMOL/L — SIGNIFICANT CHANGE UP (ref 3.5–5.3)
POTASSIUM SERPL-SCNC: 3.9 MMOL/L — SIGNIFICANT CHANGE UP (ref 3.5–5.3)
PROT SERPL-MCNC: 8.4 G/DL — HIGH (ref 6–8.3)
RBC # BLD: 5.6 M/UL — SIGNIFICANT CHANGE UP (ref 4.2–5.8)
RBC # FLD: 13.2 % — SIGNIFICANT CHANGE UP (ref 10.3–14.5)
SALICYLATES SERPL-MCNC: < 5 MG/DL — LOW (ref 15–30)
SODIUM SERPL-SCNC: 143 MMOL/L — SIGNIFICANT CHANGE UP (ref 135–145)
TROPONIN T, HIGH SENSITIVITY: 6 NG/L — SIGNIFICANT CHANGE UP (ref ?–14)
TROPONIN T, HIGH SENSITIVITY: 7 NG/L — SIGNIFICANT CHANGE UP (ref ?–14)
WBC # BLD: 9.55 K/UL — SIGNIFICANT CHANGE UP (ref 3.8–10.5)
WBC # FLD AUTO: 9.55 K/UL — SIGNIFICANT CHANGE UP (ref 3.8–10.5)

## 2018-08-13 PROCEDURE — 99284 EMERGENCY DEPT VISIT MOD MDM: CPT | Mod: 25

## 2018-08-13 PROCEDURE — 93010 ELECTROCARDIOGRAM REPORT: CPT

## 2018-08-13 NOTE — ED PROVIDER NOTE - CARE PLAN
Principal Discharge DX:	Palpitations Principal Discharge DX:	Palpitations  Assessment and plan of treatment:	1) Please follow up with your cardiologist within the next 7 days for further workup.  Please call today or tomorrow for an appointment.  If you cannot follow-up with your doctor(s), please return to the ED for any urgent issues.  2) If you have any worsening of symptoms or any other concerns please return to the ED immediately.  3) Please continue taking your home medications as directed.  4) You may have been given a copy of your labs and/or imaging.  Please go over these with your primary care doctor.

## 2018-08-13 NOTE — ED PROVIDER NOTE - PLAN OF CARE
1) Please follow up with your cardiologist within the next 7 days for further workup.  Please call today or tomorrow for an appointment.  If you cannot follow-up with your doctor(s), please return to the ED for any urgent issues.  2) If you have any worsening of symptoms or any other concerns please return to the ED immediately.  3) Please continue taking your home medications as directed.  4) You may have been given a copy of your labs and/or imaging.  Please go over these with your primary care doctor.

## 2018-08-13 NOTE — ED PROVIDER NOTE - OBJECTIVE STATEMENT
47M, PMH of HTN, typhoid fever presenting with shakiness. Patient was seen one month ago for the same symptoms. Was admitted because of elevated liver enzymes. Negative hepatitis panel, found to have hepatic steatosis. Was seen by psychiatry prescribed Mirazipine for major depressive disorder. Has not been taking any psychiatric medications since discharge because they make him feel nauseous. Today the patient had a sudden onset of anxiety, nausea  and "not feeling right" after drinking green tea. Symptoms lasted for 30 minutes and have since resolved. Denies any fever, headache, dizziness, chest pain, abdominal pain, vomiting, urinary symptoms, pain or swelling in lower extremities.

## 2018-08-13 NOTE — ED PROVIDER NOTE - PHYSICAL EXAMINATION
General: well appearing male, no acute distress   HEENT: normocephalic, atraumatic   Respiratory: normal work of breathing, lungs clear to auscultation bilaterally   Cardiac: regular rate and rhythm   Abdomen: soft, non-tender  MSK: no pain or swelling in lower extremities   Skin: no rashes   Neuro: A&Ox3

## 2018-08-13 NOTE — ED ADULT TRIAGE NOTE - CHIEF COMPLAINT QUOTE
p/t c/o of shakiness since this am,  appears very anxious, recently treated for typhoid fever, p/t denies any chest pain

## 2018-08-13 NOTE — ED PROVIDER NOTE - MEDICAL DECISION MAKING DETAILS
47M presenting after episode of anxiety and shakiness. denies any alcohol use. has had symptoms twice in the past. concern for arrythmia vs anxiety. plan for cbc, cmp, trop, ekg. will reassess.

## 2018-08-13 NOTE — ED ADULT NURSE NOTE - NSIMPLEMENTINTERV_GEN_ALL_ED
Implemented All Universal Safety Interventions:  [place] to call system. Call bell, personal items and telephone within reach. Instruct patient to call for assistance. Room bathroom lighting operational. Non-slip footwear when patient is off stretcher. Physically safe environment: no spills, clutter or unnecessary equipment. Stretcher in lowest position, wheels locked, appropriate side rails in place.

## 2018-08-13 NOTE — ED PROVIDER NOTE - ATTENDING CONTRIBUTION TO CARE
DR. BLOCH, ATTENDING MD-  I performed a face to face bedside interview with patient regarding history of present illness, review of symptoms and past medical history. I completed an independent physical exam.  I have discussed patient's plan of care with the resident.  Patient with episode daniel palpitations and shakiness after drinking green tea, has been here 3 times for same. Some component of anxiety.WEll appearing NAd HEENT nml lungs clear, abd soft nontender, heart no mgr, ext no edema, normal pulses

## 2018-08-13 NOTE — ED ADULT NURSE NOTE - OBJECTIVE STATEMENT
pt brought to rm 28, A&Ox3, skin w/d/i, c/o "shaking" since this morning, similar episode 1 month ago, ADM 2/2 elevated LFTs (dx w/ hepatic steatosis" eval'd by psychiatry prescribed Mirazipine for major depressive disorder, non-compliant w/ psychiatric medications since discharge because they make him feel nauseous, pt states today had sudden onset of anxiety, nausea  and "not feeling right" after drinking green tea lasting approx 30 minutes, has since resolved on presentation, denies any fever, headache, dizziness, chest pain, abdominal pain, currently tolerating PO, no neuro symptoms noted, appears in AND on presentation, SL placed, labs sent, awaiting results and orders, will continue to monitor.

## 2018-08-29 ENCOUNTER — EMERGENCY (EMERGENCY)
Facility: HOSPITAL | Age: 48
LOS: 1 days | Discharge: HOME | End: 2018-08-29
Attending: EMERGENCY MEDICINE | Admitting: EMERGENCY MEDICINE
Payer: MEDICAID

## 2018-08-29 VITALS
DIASTOLIC BLOOD PRESSURE: 82 MMHG | SYSTOLIC BLOOD PRESSURE: 123 MMHG | RESPIRATION RATE: 18 BRPM | OXYGEN SATURATION: 100 % | TEMPERATURE: 98 F | HEART RATE: 69 BPM

## 2018-08-29 VITALS
HEART RATE: 72 BPM | RESPIRATION RATE: 16 BRPM | OXYGEN SATURATION: 100 % | DIASTOLIC BLOOD PRESSURE: 78 MMHG | TEMPERATURE: 99 F | SYSTOLIC BLOOD PRESSURE: 119 MMHG

## 2018-08-29 LAB
ALBUMIN SERPL ELPH-MCNC: 4.2 G/DL — SIGNIFICANT CHANGE UP (ref 3.3–5)
ALP SERPL-CCNC: 31 U/L — LOW (ref 40–120)
ALT FLD-CCNC: 65 U/L — HIGH (ref 4–41)
AST SERPL-CCNC: 37 U/L — SIGNIFICANT CHANGE UP (ref 4–40)
BASOPHILS # BLD AUTO: 0.03 K/UL — SIGNIFICANT CHANGE UP (ref 0–0.2)
BASOPHILS NFR BLD AUTO: 0.4 % — SIGNIFICANT CHANGE UP (ref 0–2)
BILIRUB SERPL-MCNC: 2.2 MG/DL — HIGH (ref 0.2–1.2)
BUN SERPL-MCNC: 9 MG/DL — SIGNIFICANT CHANGE UP (ref 7–23)
CALCIUM SERPL-MCNC: 9.7 MG/DL — SIGNIFICANT CHANGE UP (ref 8.4–10.5)
CHLORIDE SERPL-SCNC: 102 MMOL/L — SIGNIFICANT CHANGE UP (ref 98–107)
CO2 SERPL-SCNC: 29 MMOL/L — SIGNIFICANT CHANGE UP (ref 22–31)
CREAT SERPL-MCNC: 1 MG/DL — SIGNIFICANT CHANGE UP (ref 0.5–1.3)
EOSINOPHIL # BLD AUTO: 0.02 K/UL — SIGNIFICANT CHANGE UP (ref 0–0.5)
EOSINOPHIL NFR BLD AUTO: 0.3 % — SIGNIFICANT CHANGE UP (ref 0–6)
GLUCOSE SERPL-MCNC: 158 MG/DL — HIGH (ref 70–99)
HBA1C BLD-MCNC: 5.3 % — SIGNIFICANT CHANGE UP (ref 4–5.6)
HCT VFR BLD CALC: 43.3 % — SIGNIFICANT CHANGE UP (ref 39–50)
HGB BLD-MCNC: 14.6 G/DL — SIGNIFICANT CHANGE UP (ref 13–17)
IMM GRANULOCYTES # BLD AUTO: 0.02 # — SIGNIFICANT CHANGE UP
IMM GRANULOCYTES NFR BLD AUTO: 0.3 % — SIGNIFICANT CHANGE UP (ref 0–1.5)
LYMPHOCYTES # BLD AUTO: 1.85 K/UL — SIGNIFICANT CHANGE UP (ref 1–3.3)
LYMPHOCYTES # BLD AUTO: 26.1 % — SIGNIFICANT CHANGE UP (ref 13–44)
MCHC RBC-ENTMCNC: 28.1 PG — SIGNIFICANT CHANGE UP (ref 27–34)
MCHC RBC-ENTMCNC: 33.7 % — SIGNIFICANT CHANGE UP (ref 32–36)
MCV RBC AUTO: 83.3 FL — SIGNIFICANT CHANGE UP (ref 80–100)
MONOCYTES # BLD AUTO: 0.61 K/UL — SIGNIFICANT CHANGE UP (ref 0–0.9)
MONOCYTES NFR BLD AUTO: 8.6 % — SIGNIFICANT CHANGE UP (ref 2–14)
NEUTROPHILS # BLD AUTO: 4.57 K/UL — SIGNIFICANT CHANGE UP (ref 1.8–7.4)
NEUTROPHILS NFR BLD AUTO: 64.3 % — SIGNIFICANT CHANGE UP (ref 43–77)
NRBC # FLD: 0 — SIGNIFICANT CHANGE UP
PLATELET # BLD AUTO: 225 K/UL — SIGNIFICANT CHANGE UP (ref 150–400)
PMV BLD: 10.3 FL — SIGNIFICANT CHANGE UP (ref 7–13)
POTASSIUM SERPL-MCNC: 4.4 MMOL/L — SIGNIFICANT CHANGE UP (ref 3.5–5.3)
POTASSIUM SERPL-SCNC: 4.4 MMOL/L — SIGNIFICANT CHANGE UP (ref 3.5–5.3)
PROT SERPL-MCNC: 7.5 G/DL — SIGNIFICANT CHANGE UP (ref 6–8.3)
RBC # BLD: 5.2 M/UL — SIGNIFICANT CHANGE UP (ref 4.2–5.8)
RBC # FLD: 13.1 % — SIGNIFICANT CHANGE UP (ref 10.3–14.5)
SODIUM SERPL-SCNC: 141 MMOL/L — SIGNIFICANT CHANGE UP (ref 135–145)
TROPONIN T, HIGH SENSITIVITY: 7 NG/L — SIGNIFICANT CHANGE UP (ref ?–14)
TROPONIN T, HIGH SENSITIVITY: 9 NG/L — SIGNIFICANT CHANGE UP (ref ?–14)
TSH SERPL-MCNC: 1.17 UIU/ML — SIGNIFICANT CHANGE UP (ref 0.27–4.2)
WBC # BLD: 7.1 K/UL — SIGNIFICANT CHANGE UP (ref 3.8–10.5)
WBC # FLD AUTO: 7.1 K/UL — SIGNIFICANT CHANGE UP (ref 3.8–10.5)

## 2018-08-29 PROCEDURE — 71046 X-RAY EXAM CHEST 2 VIEWS: CPT | Mod: 26

## 2018-08-29 PROCEDURE — 99284 EMERGENCY DEPT VISIT MOD MDM: CPT

## 2018-08-29 RX ORDER — MECLIZINE HCL 12.5 MG
1 TABLET ORAL
Qty: 30 | Refills: 0 | OUTPATIENT
Start: 2018-08-29 | End: 2018-09-07

## 2018-08-29 RX ORDER — MECLIZINE HCL 12.5 MG
25 TABLET ORAL ONCE
Qty: 0 | Refills: 0 | Status: COMPLETED | OUTPATIENT
Start: 2018-08-29 | End: 2018-08-29

## 2018-08-29 RX ORDER — SODIUM CHLORIDE 9 MG/ML
1000 INJECTION INTRAMUSCULAR; INTRAVENOUS; SUBCUTANEOUS ONCE
Qty: 0 | Refills: 0 | Status: COMPLETED | OUTPATIENT
Start: 2018-08-29 | End: 2018-08-29

## 2018-08-29 RX ADMIN — SODIUM CHLORIDE 1000 MILLILITER(S): 9 INJECTION INTRAMUSCULAR; INTRAVENOUS; SUBCUTANEOUS at 12:35

## 2018-08-29 RX ADMIN — Medication 25 MILLIGRAM(S): at 13:32

## 2018-08-29 RX ADMIN — SODIUM CHLORIDE 1000 MILLILITER(S): 9 INJECTION INTRAMUSCULAR; INTRAVENOUS; SUBCUTANEOUS at 13:32

## 2018-08-29 NOTE — ED ADULT TRIAGE NOTE - CHIEF COMPLAINT QUOTE
states" I was at a diagnostic testing center to get tented for DM , started to feel light headed with chest pain after drinking sugar water. "h/o panic attacks in the past. states" I was at a diagnostic testing center to get tented for DM , started to feel light headed with chest pain after drinking sugar water. "h/o panic attacks in the past.  mg by EMS. IV saline lock with 20 Angiocath by EMS

## 2018-08-29 NOTE — ED PROVIDER NOTE - OBJECTIVE STATEMENT
47M denies significant pmh presents after episode of lightheadedness, weakness, 47M denies significant pmh presents after episode of lightheadedness, weakness, and nausea that occurred while he was obtaining a glucose tolerance test today around 10:30am.  Episode lasted approximately 30min, similar to two previous episodes that he's had, told likely panic attacks.  Pt also endorses recent poor appetite.  Denies fever/chills, cp, sob, n/v/d, focal neuro deficits currently. 47M denies significant pmh presents after episode of lightheadedness, weakness, and chest discomfort that occurred while he was obtaining a glucose tolerance test today around 10:30am.  Episode lasted approximately 30min, similar to two previous episodes that he's had, told likely panic attacks.  Pt also endorses recent poor appetite.  Denies fever/chills, cp, sob, n/v/d, focal neuro deficits currently.  No family h/o cardiac disease.

## 2018-08-29 NOTE — ED ADULT NURSE NOTE - CHIEF COMPLAINT QUOTE
states" I was at a diagnostic testing center to get tented for DM , started to feel light headed with chest pain after drinking sugar water. "h/o panic attacks in the past.  mg by EMS. IV saline lock with 20 Angiocath by EMS

## 2018-09-25 PROBLEM — Z00.00 ENCOUNTER FOR PREVENTIVE HEALTH EXAMINATION: Status: ACTIVE | Noted: 2018-09-25

## 2018-09-29 ENCOUNTER — EMERGENCY (EMERGENCY)
Facility: HOSPITAL | Age: 48
LOS: 1 days | Discharge: HOME | End: 2018-09-29
Attending: EMERGENCY MEDICINE | Admitting: EMERGENCY MEDICINE
Payer: COMMERCIAL

## 2018-09-29 VITALS
HEART RATE: 64 BPM | SYSTOLIC BLOOD PRESSURE: 131 MMHG | DIASTOLIC BLOOD PRESSURE: 96 MMHG | RESPIRATION RATE: 17 BRPM | TEMPERATURE: 98 F | OXYGEN SATURATION: 100 %

## 2018-09-29 VITALS
RESPIRATION RATE: 18 BRPM | HEART RATE: 74 BPM | TEMPERATURE: 98 F | DIASTOLIC BLOOD PRESSURE: 96 MMHG | SYSTOLIC BLOOD PRESSURE: 143 MMHG | OXYGEN SATURATION: 100 %

## 2018-09-29 PROCEDURE — 99283 EMERGENCY DEPT VISIT LOW MDM: CPT | Mod: 25

## 2018-09-29 RX ORDER — LIDOCAINE 4 G/100G
10 CREAM TOPICAL ONCE
Qty: 0 | Refills: 0 | Status: COMPLETED | OUTPATIENT
Start: 2018-09-29 | End: 2018-09-29

## 2018-09-29 RX ORDER — ACETAMINOPHEN 500 MG
975 TABLET ORAL ONCE
Qty: 0 | Refills: 0 | Status: COMPLETED | OUTPATIENT
Start: 2018-09-29 | End: 2018-09-29

## 2018-09-29 RX ORDER — DIPHENHYDRAMINE HCL 50 MG
25 CAPSULE ORAL ONCE
Qty: 0 | Refills: 0 | Status: DISCONTINUED | OUTPATIENT
Start: 2018-09-29 | End: 2018-10-03

## 2018-09-29 RX ADMIN — Medication 1 MILLIGRAM(S): at 06:46

## 2018-09-29 RX ADMIN — Medication 30 MILLILITER(S): at 06:00

## 2018-09-29 RX ADMIN — LIDOCAINE 10 MILLILITER(S): 4 CREAM TOPICAL at 06:00

## 2018-09-29 NOTE — ED ADULT TRIAGE NOTE - CHIEF COMPLAINT QUOTE
Ambulatory complaining of headache/nausea/chest pain for t he passed couple of days, worsened this morning. Also reports weakness and insomnia. Family states that he has been seen here before for same with no real diagnosis, denies PMH/PSH. EKG in progress. VSS, NAD.

## 2018-09-29 NOTE — ED ADULT NURSE NOTE - NSIMPLEMENTINTERV_GEN_ALL_ED
Implemented All Universal Safety Interventions:  [place] to call system. Call bell, personal items and telephone within reach. Instruct patient to call for assistance. Room bathroom lighting operational. Non-slip footwear when patient is off stretcher. Physically safe environment: no spills, clutter or unnecessary equipment. Stretcher in lowest position, wheels locked, appropriate side rails in place.

## 2018-09-29 NOTE — ED PROVIDER NOTE - MEDICAL DECISION MAKING DETAILS
Pt here for chronic depressive symptoms and inability to sleep. Has seen [facility] here in the past, but apparently fallen out of f/u. Will medically clear, and have psych evaluate the patient -[place]

## 2018-09-29 NOTE — ED ADULT NURSE NOTE - OBJECTIVE STATEMENT
rec'd pt. a&ox3, with hx of anxiety, depression and typhoid, came in c/o intermittent headache and mid chest pain x 2 days. pt. denies any dizziness nor SOB. pt. c/o nausea, no vomiting/diarrhea/constipation/fever. pt. c/o chills and generalized weakness. pt. appears anxious and uncomfortable, keeps on moving his head while talking, stating has not been sleeping for the past month, states "sometimes I feel like I don't want to live." for the past week. pt. denies any suicide attempt, no HI nor hallucinations. family at bedside. MD Naranjo made aware. no CO for now until MD evaluation, as per MD. will continue to monitor

## 2018-09-29 NOTE — ED ADULT NURSE REASSESSMENT NOTE - NS ED NURSE REASSESS COMMENT FT1
Break Coverage RN - Report received from RN Theresa Castanon. Pt. c/o burning abdominal pain and states "I want to sleep." Pt. appears anxious at present. MD Santamaria made aware. Medication given as per order. Pt. refused tylenol and benadryl and states "benadryl makes me feel bad." Respirations are even and unlabored on room air. Family at bedside. As per MD Santamaria, awaiting psych consult at present. Side rails up, call bell within reach.

## 2018-09-29 NOTE — ED PROVIDER NOTE - ATTENDING CONTRIBUTION TO CARE
I performed a face to face bedside interview with patient regarding history of present illness, review of symptoms and past medical history. I completed an independent physical exam.  I have discussed patient's plan of care.   I agree with note as stated above, having amended the EMR as needed to reflect my findings. I have discussed the assessment and plan of care.  This includes during the time I functioned as the attending physician for this patient.  Attending Contribution to Care: agree with plan of resident. pt p/w chronic depression. states has hx of insomnia. currently insomnia as well. states is chronic situation. took citalopram in the past but self d/c because states

## 2018-09-29 NOTE — ED PROVIDER NOTE - PROGRESS NOTE DETAILS
Pt and family agree that psych in ED is not necessary, agree to Ativan PO now w/ temporary prescription and they will f/u w/ crisis center on Monday morning -[place]

## 2018-09-29 NOTE — ED PROVIDER NOTE - OBJECTIVE STATEMENT
47y m w no sig PMHx p/w constellation of symptoms including insomnia, anxiety, and loss of appetite. He has had multiple ER visits for these symptoms in the past and referred to multiple outpatient f/u which have all yielded negative w/u. These specialities include cardiology, neurology, and psychiatry. Denies any improvement or worsening in his symptoms. He states his major concern is his inability to sleep despite fatigue.

## 2018-09-29 NOTE — ED PROVIDER NOTE - CPE EDP [facility] NORM
normal...
[facility] Hearing Screen Program/Shaken Baby Prevention Handout/Guide to Postpartum Care/Birth Certificate Instructions/[facility]  Screening Program/Bottle Feeding Log/Back To Sleep Handout

## 2018-09-29 NOTE — ED ADULT NURSE REASSESSMENT NOTE - NS ED NURSE REASSESS COMMENT FT1
pt. a&ox3, states feeling a little better, pain is lessened, Ativan given as ordered. pt's brother at bedside. awaits dispo.

## 2018-10-16 ENCOUNTER — OUTPATIENT (OUTPATIENT)
Dept: OUTPATIENT SERVICES | Facility: HOSPITAL | Age: 48
LOS: 1 days | Discharge: STILL ADMITTED OR ADMINISTRATIVE | End: 2018-10-16
Payer: MEDICARE

## 2018-10-25 ENCOUNTER — APPOINTMENT (OUTPATIENT)
Dept: ENDOCRINOLOGY | Facility: CLINIC | Age: 48
End: 2018-10-25

## 2018-10-30 DIAGNOSIS — F32.9 MAJOR DEPRESSIVE DISORDER, SINGLE EPISODE, UNSPECIFIED: ICD-10-CM

## 2019-09-23 NOTE — ED ADULT NURSE NOTE - ED STAT RN HANDOFF DETAILS
Abdominal Pain, N/V/D Rpt given to [facility] DARIAN Sarkar.  Pt vitally stable, in no distress, on droplet precautions for Typhoid Fever.  Last dose antibiotics given 1911.  Will transport pt over when room avail.

## 2019-10-01 NOTE — ED ADULT NURSE NOTE - CADM POA CENTRAL LINE
pt s/p mechanical fall on sunday night landed on her buttocks states she is now experiencing abdominal pain states she is 3 months pregnant + vaginal bleeding scant here for further evaluations
No

## 2022-05-23 NOTE — ED PROVIDER NOTE - CHIEF COMPLAINT
The patient is a 47y Male complaining of weakness.
No respiratory distress. No stridor, Lungs sounds clear with good aeration bilaterally.

## 2023-03-28 NOTE — PROGRESS NOTE ADULT - PROBLEM/PLAN-3
Ryan Gallego discharge to home/self care.      
DISPLAY PLAN FREE TEXT

## 2025-08-09 ENCOUNTER — INPATIENT (INPATIENT)
Facility: HOSPITAL | Age: 55
LOS: 4 days | Discharge: HOME | DRG: 270 | End: 2025-08-14
Attending: INTERNAL MEDICINE | Admitting: STUDENT IN AN ORGANIZED HEALTH CARE EDUCATION/TRAINING PROGRAM
Payer: COMMERCIAL

## 2025-08-09 VITALS
DIASTOLIC BLOOD PRESSURE: 98 MMHG | SYSTOLIC BLOOD PRESSURE: 150 MMHG | RESPIRATION RATE: 20 BRPM | TEMPERATURE: 98 F | OXYGEN SATURATION: 98 % | HEIGHT: 62 IN | HEART RATE: 77 BPM | WEIGHT: 139.99 LBS

## 2025-08-09 DIAGNOSIS — I21.4 NON-ST ELEVATION (NSTEMI) MYOCARDIAL INFARCTION: ICD-10-CM

## 2025-08-09 LAB
ALBUMIN SERPL ELPH-MCNC: 4.1 G/DL — SIGNIFICANT CHANGE UP (ref 3.3–5)
ALP SERPL-CCNC: 32 U/L — LOW (ref 40–120)
ALT FLD-CCNC: 64 U/L — HIGH (ref 10–45)
ANION GAP SERPL CALC-SCNC: 16 MMOL/L — SIGNIFICANT CHANGE UP (ref 5–17)
APTT BLD: 28.7 SEC — SIGNIFICANT CHANGE UP (ref 26.1–36.8)
AST SERPL-CCNC: 51 U/L — HIGH (ref 10–40)
BASOPHILS # BLD AUTO: 0.05 K/UL — SIGNIFICANT CHANGE UP (ref 0–0.2)
BASOPHILS NFR BLD AUTO: 0.6 % — SIGNIFICANT CHANGE UP (ref 0–2)
BILIRUB SERPL-MCNC: 2.3 MG/DL — HIGH (ref 0.2–1.2)
BUN SERPL-MCNC: 16 MG/DL — SIGNIFICANT CHANGE UP (ref 7–23)
CALCIUM SERPL-MCNC: 9.9 MG/DL — SIGNIFICANT CHANGE UP (ref 8.4–10.5)
CHLORIDE SERPL-SCNC: 102 MMOL/L — SIGNIFICANT CHANGE UP (ref 96–108)
CO2 SERPL-SCNC: 23 MMOL/L — SIGNIFICANT CHANGE UP (ref 22–31)
CREAT SERPL-MCNC: 0.96 MG/DL — SIGNIFICANT CHANGE UP (ref 0.5–1.3)
EGFR: 95 ML/MIN/1.73M2 — SIGNIFICANT CHANGE UP
EGFR: 95 ML/MIN/1.73M2 — SIGNIFICANT CHANGE UP
EOSINOPHIL # BLD AUTO: 0.34 K/UL — SIGNIFICANT CHANGE UP (ref 0–0.5)
EOSINOPHIL NFR BLD AUTO: 4.4 % — SIGNIFICANT CHANGE UP (ref 0–6)
GLUCOSE SERPL-MCNC: 111 MG/DL — HIGH (ref 70–99)
HCT VFR BLD CALC: 49.3 % — SIGNIFICANT CHANGE UP (ref 39–50)
HGB BLD-MCNC: 16.4 G/DL — SIGNIFICANT CHANGE UP (ref 13–17)
IMM GRANULOCYTES # BLD AUTO: 0.02 K/UL — SIGNIFICANT CHANGE UP (ref 0–0.07)
IMM GRANULOCYTES NFR BLD AUTO: 0.3 % — SIGNIFICANT CHANGE UP (ref 0–0.9)
INR BLD: 1.04 RATIO — SIGNIFICANT CHANGE UP (ref 0.85–1.16)
LYMPHOCYTES # BLD AUTO: 1.93 K/UL — SIGNIFICANT CHANGE UP (ref 1–3.3)
LYMPHOCYTES NFR BLD AUTO: 24.7 % — SIGNIFICANT CHANGE UP (ref 13–44)
MCHC RBC-ENTMCNC: 28.7 PG — SIGNIFICANT CHANGE UP (ref 27–34)
MCHC RBC-ENTMCNC: 33.3 G/DL — SIGNIFICANT CHANGE UP (ref 32–36)
MCV RBC AUTO: 86.3 FL — SIGNIFICANT CHANGE UP (ref 80–100)
MONOCYTES # BLD AUTO: 0.89 K/UL — SIGNIFICANT CHANGE UP (ref 0–0.9)
MONOCYTES NFR BLD AUTO: 11.4 % — SIGNIFICANT CHANGE UP (ref 2–14)
NEUTROPHILS # BLD AUTO: 4.57 K/UL — SIGNIFICANT CHANGE UP (ref 1.8–7.4)
NEUTROPHILS NFR BLD AUTO: 58.6 % — SIGNIFICANT CHANGE UP (ref 43–77)
NRBC # BLD AUTO: 0 K/UL — SIGNIFICANT CHANGE UP (ref 0–0)
NRBC # FLD: 0 K/UL — SIGNIFICANT CHANGE UP (ref 0–0)
NRBC BLD AUTO-RTO: 0 /100 WBCS — SIGNIFICANT CHANGE UP (ref 0–0)
NT-PROBNP SERPL-SCNC: 314 PG/ML — HIGH (ref 0–300)
PLATELET # BLD AUTO: 209 K/UL — SIGNIFICANT CHANGE UP (ref 150–400)
PMV BLD: 10.5 FL — SIGNIFICANT CHANGE UP (ref 7–13)
POTASSIUM SERPL-MCNC: 3.9 MMOL/L — SIGNIFICANT CHANGE UP (ref 3.5–5.3)
POTASSIUM SERPL-SCNC: 3.9 MMOL/L — SIGNIFICANT CHANGE UP (ref 3.5–5.3)
PROT SERPL-MCNC: 8.1 G/DL — SIGNIFICANT CHANGE UP (ref 6–8.3)
PROTHROM AB SERPL-ACNC: 12 SEC — SIGNIFICANT CHANGE UP (ref 9.9–13.4)
RBC # BLD: 5.71 M/UL — SIGNIFICANT CHANGE UP (ref 4.2–5.8)
RBC # FLD: 12.8 % — SIGNIFICANT CHANGE UP (ref 10.3–14.5)
SODIUM SERPL-SCNC: 141 MMOL/L — SIGNIFICANT CHANGE UP (ref 135–145)
TROPONIN T, HIGH SENSITIVITY RESULT: 1083 NG/L — HIGH (ref 0–51)
WBC # BLD: 7.8 K/UL — SIGNIFICANT CHANGE UP (ref 3.8–10.5)
WBC # FLD AUTO: 7.8 K/UL — SIGNIFICANT CHANGE UP (ref 3.8–10.5)

## 2025-08-09 PROCEDURE — 82330 ASSAY OF CALCIUM: CPT

## 2025-08-09 PROCEDURE — 84132 ASSAY OF SERUM POTASSIUM: CPT

## 2025-08-09 PROCEDURE — 84484 ASSAY OF TROPONIN QUANT: CPT

## 2025-08-09 PROCEDURE — 99152 MOD SED SAME PHYS/QHP 5/>YRS: CPT

## 2025-08-09 PROCEDURE — 99291 CRITICAL CARE FIRST HOUR: CPT | Mod: 25

## 2025-08-09 PROCEDURE — 82435 ASSAY OF BLOOD CHLORIDE: CPT

## 2025-08-09 PROCEDURE — 76604 US EXAM CHEST: CPT | Mod: 26

## 2025-08-09 PROCEDURE — 85014 HEMATOCRIT: CPT

## 2025-08-09 PROCEDURE — ZZZZZ: CPT

## 2025-08-09 PROCEDURE — 83880 ASSAY OF NATRIURETIC PEPTIDE: CPT

## 2025-08-09 PROCEDURE — 76604 US EXAM CHEST: CPT

## 2025-08-09 PROCEDURE — 99291 CRITICAL CARE FIRST HOUR: CPT

## 2025-08-09 PROCEDURE — 93010 ELECTROCARDIOGRAM REPORT: CPT | Mod: 76

## 2025-08-09 PROCEDURE — 85025 COMPLETE CBC W/AUTO DIFF WBC: CPT

## 2025-08-09 PROCEDURE — 71045 X-RAY EXAM CHEST 1 VIEW: CPT

## 2025-08-09 PROCEDURE — 80053 COMPREHEN METABOLIC PANEL: CPT

## 2025-08-09 PROCEDURE — 93458 L HRT ARTERY/VENTRICLE ANGIO: CPT | Mod: 26

## 2025-08-09 PROCEDURE — 85610 PROTHROMBIN TIME: CPT

## 2025-08-09 PROCEDURE — 71045 X-RAY EXAM CHEST 1 VIEW: CPT | Mod: 26,76

## 2025-08-09 PROCEDURE — 84295 ASSAY OF SERUM SODIUM: CPT

## 2025-08-09 PROCEDURE — 82803 BLOOD GASES ANY COMBINATION: CPT

## 2025-08-09 PROCEDURE — 33967 INSERT I-AORT PERCUT DEVICE: CPT

## 2025-08-09 PROCEDURE — 82947 ASSAY GLUCOSE BLOOD QUANT: CPT

## 2025-08-09 PROCEDURE — 85018 HEMOGLOBIN: CPT

## 2025-08-09 PROCEDURE — 85730 THROMBOPLASTIN TIME PARTIAL: CPT

## 2025-08-09 PROCEDURE — 83605 ASSAY OF LACTIC ACID: CPT

## 2025-08-09 RX ORDER — ASPIRIN 325 MG
81 TABLET ORAL DAILY
Refills: 0 | Status: DISCONTINUED | OUTPATIENT
Start: 2025-08-10 | End: 2025-08-14

## 2025-08-09 RX ORDER — ASPIRIN 325 MG
324 TABLET ORAL ONCE
Refills: 0 | Status: COMPLETED | OUTPATIENT
Start: 2025-08-09 | End: 2025-08-09

## 2025-08-09 RX ORDER — TICAGRELOR 90 MG/1
180 TABLET ORAL ONCE
Refills: 0 | Status: COMPLETED | OUTPATIENT
Start: 2025-08-09 | End: 2025-08-09

## 2025-08-09 RX ORDER — TICAGRELOR 90 MG/1
180 TABLET ORAL ONCE
Refills: 0 | Status: DISCONTINUED | OUTPATIENT
Start: 2025-08-09 | End: 2025-08-09

## 2025-08-09 RX ORDER — ATORVASTATIN CALCIUM 80 MG/1
80 TABLET, FILM COATED ORAL AT BEDTIME
Refills: 0 | Status: DISCONTINUED | OUTPATIENT
Start: 2025-08-09 | End: 2025-08-14

## 2025-08-09 RX ORDER — HEPARIN SODIUM 1000 [USP'U]/ML
3800 INJECTION INTRAVENOUS; SUBCUTANEOUS ONCE
Refills: 0 | Status: COMPLETED | OUTPATIENT
Start: 2025-08-09 | End: 2025-08-09

## 2025-08-09 RX ORDER — HEPARIN SODIUM 1000 [USP'U]/ML
INJECTION INTRAVENOUS; SUBCUTANEOUS
Qty: 25000 | Refills: 0 | Status: DISCONTINUED | OUTPATIENT
Start: 2025-08-09 | End: 2025-08-09

## 2025-08-09 RX ORDER — CARVEDILOL 3.12 MG/1
3.12 TABLET, FILM COATED ORAL EVERY 12 HOURS
Refills: 0 | Status: DISCONTINUED | OUTPATIENT
Start: 2025-08-09 | End: 2025-08-10

## 2025-08-09 RX ORDER — HEPARIN SODIUM 1000 [USP'U]/ML
3800 INJECTION INTRAVENOUS; SUBCUTANEOUS EVERY 6 HOURS
Refills: 0 | Status: DISCONTINUED | OUTPATIENT
Start: 2025-08-09 | End: 2025-08-09

## 2025-08-09 RX ORDER — NITROGLYCERIN 20 MG/G
10 OINTMENT TOPICAL
Qty: 50 | Refills: 0 | Status: DISCONTINUED | OUTPATIENT
Start: 2025-08-09 | End: 2025-08-09

## 2025-08-09 RX ADMIN — HEPARIN SODIUM 3800 UNIT(S): 1000 INJECTION INTRAVENOUS; SUBCUTANEOUS at 17:29

## 2025-08-09 RX ADMIN — ATORVASTATIN CALCIUM 80 MILLIGRAM(S): 80 TABLET, FILM COATED ORAL at 21:12

## 2025-08-09 RX ADMIN — HEPARIN SODIUM 750 UNIT(S)/HR: 1000 INJECTION INTRAVENOUS; SUBCUTANEOUS at 17:30

## 2025-08-09 RX ADMIN — NITROGLYCERIN 3 MICROGRAM(S)/MIN: 20 OINTMENT TOPICAL at 18:21

## 2025-08-09 RX ADMIN — CARVEDILOL 3.12 MILLIGRAM(S): 3.12 TABLET, FILM COATED ORAL at 22:36

## 2025-08-09 RX ADMIN — TICAGRELOR 180 MILLIGRAM(S): 90 TABLET ORAL at 17:12

## 2025-08-09 RX ADMIN — Medication 324 MILLIGRAM(S): at 17:13

## 2025-08-09 RX ADMIN — Medication 1 APPLICATION(S): at 21:12

## 2025-08-10 DIAGNOSIS — I25.10 ATHEROSCLEROTIC HEART DISEASE OF NATIVE CORONARY ARTERY WITHOUT ANGINA PECTORIS: ICD-10-CM

## 2025-08-10 LAB
A1C WITH ESTIMATED AVERAGE GLUCOSE RESULT: 5.8 % — HIGH (ref 4–5.6)
ALBUMIN SERPL ELPH-MCNC: 4.1 G/DL — SIGNIFICANT CHANGE UP (ref 3.3–5)
ALBUMIN SERPL ELPH-MCNC: 4.2 G/DL — SIGNIFICANT CHANGE UP (ref 3.3–5)
ALP SERPL-CCNC: 31 U/L — LOW (ref 40–120)
ALP SERPL-CCNC: 32 U/L — LOW (ref 40–120)
ALT FLD-CCNC: 51 U/L — HIGH (ref 10–45)
ALT FLD-CCNC: 62 U/L — HIGH (ref 10–45)
ANION GAP SERPL CALC-SCNC: 16 MMOL/L — SIGNIFICANT CHANGE UP (ref 5–17)
ANION GAP SERPL CALC-SCNC: 17 MMOL/L — SIGNIFICANT CHANGE UP (ref 5–17)
APPEARANCE UR: CLEAR — SIGNIFICANT CHANGE UP
APTT BLD: 28 SEC — SIGNIFICANT CHANGE UP (ref 26.1–36.8)
APTT BLD: 53.5 SEC — HIGH (ref 26.1–36.8)
AST SERPL-CCNC: 33 U/L — SIGNIFICANT CHANGE UP (ref 10–40)
AST SERPL-CCNC: 46 U/L — HIGH (ref 10–40)
BACTERIA # UR AUTO: NEGATIVE /HPF — SIGNIFICANT CHANGE UP
BILIRUB SERPL-MCNC: 2.7 MG/DL — HIGH (ref 0.2–1.2)
BILIRUB SERPL-MCNC: 2.9 MG/DL — HIGH (ref 0.2–1.2)
BILIRUB UR-MCNC: NEGATIVE — SIGNIFICANT CHANGE UP
BLD GP AB SCN SERPL QL: NEGATIVE — SIGNIFICANT CHANGE UP
BUN SERPL-MCNC: 16 MG/DL — SIGNIFICANT CHANGE UP (ref 7–23)
BUN SERPL-MCNC: 17 MG/DL — SIGNIFICANT CHANGE UP (ref 7–23)
CALCIUM SERPL-MCNC: 9.2 MG/DL — SIGNIFICANT CHANGE UP (ref 8.4–10.5)
CALCIUM SERPL-MCNC: 9.7 MG/DL — SIGNIFICANT CHANGE UP (ref 8.4–10.5)
CAST: 0 /LPF — SIGNIFICANT CHANGE UP (ref 0–4)
CHLORIDE SERPL-SCNC: 100 MMOL/L — SIGNIFICANT CHANGE UP (ref 96–108)
CHLORIDE SERPL-SCNC: 101 MMOL/L — SIGNIFICANT CHANGE UP (ref 96–108)
CHOLEST SERPL-MCNC: 262 MG/DL — HIGH
CO2 SERPL-SCNC: 19 MMOL/L — LOW (ref 22–31)
CO2 SERPL-SCNC: 23 MMOL/L — SIGNIFICANT CHANGE UP (ref 22–31)
COLOR SPEC: YELLOW — SIGNIFICANT CHANGE UP
CREAT SERPL-MCNC: 0.92 MG/DL — SIGNIFICANT CHANGE UP (ref 0.5–1.3)
CREAT SERPL-MCNC: 0.98 MG/DL — SIGNIFICANT CHANGE UP (ref 0.5–1.3)
DIFF PNL FLD: ABNORMAL
EGFR: 100 ML/MIN/1.73M2 — SIGNIFICANT CHANGE UP
EGFR: 100 ML/MIN/1.73M2 — SIGNIFICANT CHANGE UP
EGFR: 93 ML/MIN/1.73M2 — SIGNIFICANT CHANGE UP
EGFR: 93 ML/MIN/1.73M2 — SIGNIFICANT CHANGE UP
ESTIMATED AVERAGE GLUCOSE: 120 MG/DL — HIGH (ref 68–114)
GLUCOSE SERPL-MCNC: 111 MG/DL — HIGH (ref 70–99)
GLUCOSE SERPL-MCNC: 118 MG/DL — HIGH (ref 70–99)
GLUCOSE UR QL: NEGATIVE MG/DL — SIGNIFICANT CHANGE UP
HCT VFR BLD CALC: 44.4 % — SIGNIFICANT CHANGE UP (ref 39–50)
HCT VFR BLD CALC: 47.1 % — SIGNIFICANT CHANGE UP (ref 39–50)
HDLC SERPL-MCNC: 55 MG/DL — SIGNIFICANT CHANGE UP
HGB BLD-MCNC: 15.1 G/DL — SIGNIFICANT CHANGE UP (ref 13–17)
HGB BLD-MCNC: 16 G/DL — SIGNIFICANT CHANGE UP (ref 13–17)
INR BLD: 1.1 RATIO — SIGNIFICANT CHANGE UP (ref 0.85–1.16)
INR BLD: 1.1 RATIO — SIGNIFICANT CHANGE UP (ref 0.85–1.16)
KETONES UR QL: 15 MG/DL
LACTATE SERPL-SCNC: 2.5 MMOL/L — HIGH (ref 0.5–2)
LDLC SERPL-MCNC: 186 MG/DL — HIGH
LEUKOCYTE ESTERASE UR-ACNC: NEGATIVE — SIGNIFICANT CHANGE UP
LIPID PNL WITH DIRECT LDL SERPL: 186 MG/DL — HIGH
MAGNESIUM SERPL-MCNC: 2 MG/DL — SIGNIFICANT CHANGE UP (ref 1.6–2.6)
MAGNESIUM SERPL-MCNC: 2 MG/DL — SIGNIFICANT CHANGE UP (ref 1.6–2.6)
MCHC RBC-ENTMCNC: 29 PG — SIGNIFICANT CHANGE UP (ref 27–34)
MCHC RBC-ENTMCNC: 29.2 PG — SIGNIFICANT CHANGE UP (ref 27–34)
MCHC RBC-ENTMCNC: 34 G/DL — SIGNIFICANT CHANGE UP (ref 32–36)
MCHC RBC-ENTMCNC: 34 G/DL — SIGNIFICANT CHANGE UP (ref 32–36)
MCV RBC AUTO: 85.5 FL — SIGNIFICANT CHANGE UP (ref 80–100)
MCV RBC AUTO: 85.9 FL — SIGNIFICANT CHANGE UP (ref 80–100)
NITRITE UR-MCNC: NEGATIVE — SIGNIFICANT CHANGE UP
NONHDLC SERPL-MCNC: 207 MG/DL — HIGH
NRBC # BLD AUTO: 0 K/UL — SIGNIFICANT CHANGE UP (ref 0–0)
NRBC # BLD AUTO: 0 K/UL — SIGNIFICANT CHANGE UP (ref 0–0)
NRBC # FLD: 0 K/UL — SIGNIFICANT CHANGE UP (ref 0–0)
NRBC # FLD: 0 K/UL — SIGNIFICANT CHANGE UP (ref 0–0)
NRBC BLD AUTO-RTO: 0 /100 WBCS — SIGNIFICANT CHANGE UP (ref 0–0)
NRBC BLD AUTO-RTO: 0 /100 WBCS — SIGNIFICANT CHANGE UP (ref 0–0)
PA ADP PRP-ACNC: 50 PRU — LOW (ref 182–335)
PH UR: 7 — SIGNIFICANT CHANGE UP (ref 5–8)
PHOSPHATE SERPL-MCNC: 3.6 MG/DL — SIGNIFICANT CHANGE UP (ref 2.5–4.5)
PHOSPHATE SERPL-MCNC: 4.1 MG/DL — SIGNIFICANT CHANGE UP (ref 2.5–4.5)
PLATELET # BLD AUTO: 216 K/UL — SIGNIFICANT CHANGE UP (ref 150–400)
PLATELET # BLD AUTO: 222 K/UL — SIGNIFICANT CHANGE UP (ref 150–400)
PMV BLD: 9.7 FL — SIGNIFICANT CHANGE UP (ref 7–13)
PMV BLD: 9.7 FL — SIGNIFICANT CHANGE UP (ref 7–13)
POTASSIUM SERPL-MCNC: 3.6 MMOL/L — SIGNIFICANT CHANGE UP (ref 3.5–5.3)
POTASSIUM SERPL-MCNC: 4.5 MMOL/L — SIGNIFICANT CHANGE UP (ref 3.5–5.3)
POTASSIUM SERPL-SCNC: 3.6 MMOL/L — SIGNIFICANT CHANGE UP (ref 3.5–5.3)
POTASSIUM SERPL-SCNC: 4.5 MMOL/L — SIGNIFICANT CHANGE UP (ref 3.5–5.3)
PROT SERPL-MCNC: 7.4 G/DL — SIGNIFICANT CHANGE UP (ref 6–8.3)
PROT SERPL-MCNC: 7.9 G/DL — SIGNIFICANT CHANGE UP (ref 6–8.3)
PROT UR-MCNC: NEGATIVE MG/DL — SIGNIFICANT CHANGE UP
PROTHROM AB SERPL-ACNC: 12.5 SEC — SIGNIFICANT CHANGE UP (ref 9.9–13.4)
PROTHROM AB SERPL-ACNC: 12.5 SEC — SIGNIFICANT CHANGE UP (ref 9.9–13.4)
RBC # BLD: 5.17 M/UL — SIGNIFICANT CHANGE UP (ref 4.2–5.8)
RBC # BLD: 5.51 M/UL — SIGNIFICANT CHANGE UP (ref 4.2–5.8)
RBC # FLD: 12.4 % — SIGNIFICANT CHANGE UP (ref 10.3–14.5)
RBC # FLD: 12.6 % — SIGNIFICANT CHANGE UP (ref 10.3–14.5)
RBC CASTS # UR COMP ASSIST: 56 /HPF — HIGH (ref 0–4)
RH IG SCN BLD-IMP: POSITIVE — SIGNIFICANT CHANGE UP
SODIUM SERPL-SCNC: 136 MMOL/L — SIGNIFICANT CHANGE UP (ref 135–145)
SODIUM SERPL-SCNC: 140 MMOL/L — SIGNIFICANT CHANGE UP (ref 135–145)
SP GR SPEC: >1.03 — HIGH (ref 1–1.03)
SQUAMOUS # UR AUTO: 0 /HPF — SIGNIFICANT CHANGE UP (ref 0–5)
TRIGL SERPL-MCNC: 117 MG/DL — SIGNIFICANT CHANGE UP
TSH SERPL-MCNC: 2.61 UIU/ML — SIGNIFICANT CHANGE UP (ref 0.27–4.2)
UROBILINOGEN FLD QL: 1 MG/DL — SIGNIFICANT CHANGE UP (ref 0.2–1)
WBC # BLD: 8.76 K/UL — SIGNIFICANT CHANGE UP (ref 3.8–10.5)
WBC # BLD: 9.05 K/UL — SIGNIFICANT CHANGE UP (ref 3.8–10.5)
WBC # FLD AUTO: 8.76 K/UL — SIGNIFICANT CHANGE UP (ref 3.8–10.5)
WBC # FLD AUTO: 9.05 K/UL — SIGNIFICANT CHANGE UP (ref 3.8–10.5)
WBC UR QL: 0 /HPF — SIGNIFICANT CHANGE UP (ref 0–5)

## 2025-08-10 PROCEDURE — 99291 CRITICAL CARE FIRST HOUR: CPT

## 2025-08-10 PROCEDURE — 93010 ELECTROCARDIOGRAM REPORT: CPT

## 2025-08-10 PROCEDURE — 93880 EXTRACRANIAL BILAT STUDY: CPT | Mod: 26

## 2025-08-10 PROCEDURE — 71045 X-RAY EXAM CHEST 1 VIEW: CPT | Mod: 26

## 2025-08-10 PROCEDURE — 93306 TTE W/DOPPLER COMPLETE: CPT | Mod: 26

## 2025-08-10 PROCEDURE — 99292 CRITICAL CARE ADDL 30 MIN: CPT

## 2025-08-10 PROCEDURE — 99292 CRITICAL CARE ADDL 30 MIN: CPT | Mod: 25

## 2025-08-10 RX ORDER — CARVEDILOL 3.12 MG/1
6.25 TABLET, FILM COATED ORAL EVERY 12 HOURS
Refills: 0 | Status: DISCONTINUED | OUTPATIENT
Start: 2025-08-10 | End: 2025-08-12

## 2025-08-10 RX ORDER — SODIUM CHLORIDE 9 G/1000ML
500 INJECTION, SOLUTION INTRAVENOUS
Refills: 0 | Status: DISCONTINUED | OUTPATIENT
Start: 2025-08-10 | End: 2025-08-11

## 2025-08-10 RX ORDER — HEPARIN SODIUM 1000 [USP'U]/ML
1200 INJECTION INTRAVENOUS; SUBCUTANEOUS
Qty: 25000 | Refills: 0 | Status: DISCONTINUED | OUTPATIENT
Start: 2025-08-10 | End: 2025-08-12

## 2025-08-10 RX ORDER — LIDOCAINE HCL/PF 10 MG/ML
1 VIAL (ML) INJECTION EVERY 4 HOURS
Refills: 0 | Status: DISCONTINUED | OUTPATIENT
Start: 2025-08-10 | End: 2025-08-11

## 2025-08-10 RX ADMIN — ATORVASTATIN CALCIUM 80 MILLIGRAM(S): 80 TABLET, FILM COATED ORAL at 22:22

## 2025-08-10 RX ADMIN — HEPARIN SODIUM 13 UNIT(S)/HR: 1000 INJECTION INTRAVENOUS; SUBCUTANEOUS at 18:07

## 2025-08-10 RX ADMIN — Medication 1 MILLILITER(S): at 12:29

## 2025-08-10 RX ADMIN — CARVEDILOL 6.25 MILLIGRAM(S): 3.12 TABLET, FILM COATED ORAL at 18:07

## 2025-08-10 RX ADMIN — HEPARIN SODIUM 12 UNIT(S)/HR: 1000 INJECTION INTRAVENOUS; SUBCUTANEOUS at 05:44

## 2025-08-10 RX ADMIN — Medication 40 MILLIEQUIVALENT(S): at 05:44

## 2025-08-10 RX ADMIN — HEPARIN SODIUM 12 UNIT(S)/HR: 1000 INJECTION INTRAVENOUS; SUBCUTANEOUS at 12:30

## 2025-08-10 RX ADMIN — CARVEDILOL 6.25 MILLIGRAM(S): 3.12 TABLET, FILM COATED ORAL at 05:44

## 2025-08-10 RX ADMIN — Medication 81 MILLIGRAM(S): at 12:29

## 2025-08-10 RX ADMIN — Medication 500 MILLILITER(S): at 11:01

## 2025-08-10 RX ADMIN — SODIUM CHLORIDE 250 MILLILITER(S): 9 INJECTION, SOLUTION INTRAVENOUS at 16:42

## 2025-08-10 RX ADMIN — Medication 1 MILLILITER(S): at 22:22

## 2025-08-11 LAB
ALBUMIN SERPL ELPH-MCNC: 3.8 G/DL — SIGNIFICANT CHANGE UP (ref 3.3–5)
ALP SERPL-CCNC: 30 U/L — LOW (ref 40–120)
ALT FLD-CCNC: 52 U/L — HIGH (ref 10–45)
ANION GAP SERPL CALC-SCNC: 14 MMOL/L — SIGNIFICANT CHANGE UP (ref 5–17)
APTT BLD: 153.7 SEC — CRITICAL HIGH (ref 26.1–36.8)
APTT BLD: 190.1 SEC — CRITICAL HIGH (ref 26.1–36.8)
AST SERPL-CCNC: 34 U/L — SIGNIFICANT CHANGE UP (ref 10–40)
BILIRUB SERPL-MCNC: 2.1 MG/DL — HIGH (ref 0.2–1.2)
BUN SERPL-MCNC: 16 MG/DL — SIGNIFICANT CHANGE UP (ref 7–23)
CALCIUM SERPL-MCNC: 9.3 MG/DL — SIGNIFICANT CHANGE UP (ref 8.4–10.5)
CHLORIDE SERPL-SCNC: 103 MMOL/L — SIGNIFICANT CHANGE UP (ref 96–108)
CO2 SERPL-SCNC: 21 MMOL/L — LOW (ref 22–31)
CREAT SERPL-MCNC: 1.09 MG/DL — SIGNIFICANT CHANGE UP (ref 0.5–1.3)
EGFR: 82 ML/MIN/1.73M2 — SIGNIFICANT CHANGE UP
EGFR: 82 ML/MIN/1.73M2 — SIGNIFICANT CHANGE UP
GLUCOSE SERPL-MCNC: 134 MG/DL — HIGH (ref 70–99)
HCT VFR BLD CALC: 46.2 % — SIGNIFICANT CHANGE UP (ref 39–50)
HGB BLD-MCNC: 15.4 G/DL — SIGNIFICANT CHANGE UP (ref 13–17)
INR BLD: 1.07 RATIO — SIGNIFICANT CHANGE UP (ref 0.85–1.16)
INR BLD: 1.11 RATIO — SIGNIFICANT CHANGE UP (ref 0.85–1.16)
LACTATE SERPL-SCNC: 1 MMOL/L — SIGNIFICANT CHANGE UP (ref 0.5–2)
MAGNESIUM SERPL-MCNC: 2 MG/DL — SIGNIFICANT CHANGE UP (ref 1.6–2.6)
MCHC RBC-ENTMCNC: 28.8 PG — SIGNIFICANT CHANGE UP (ref 27–34)
MCHC RBC-ENTMCNC: 33.3 G/DL — SIGNIFICANT CHANGE UP (ref 32–36)
MCV RBC AUTO: 86.4 FL — SIGNIFICANT CHANGE UP (ref 80–100)
NRBC # BLD AUTO: 0 K/UL — SIGNIFICANT CHANGE UP (ref 0–0)
NRBC # FLD: 0 K/UL — SIGNIFICANT CHANGE UP (ref 0–0)
NRBC BLD AUTO-RTO: 0 /100 WBCS — SIGNIFICANT CHANGE UP (ref 0–0)
PA ADP PRP-ACNC: 123 PRU — LOW (ref 182–335)
PHOSPHATE SERPL-MCNC: 3.9 MG/DL — SIGNIFICANT CHANGE UP (ref 2.5–4.5)
PLATELET # BLD AUTO: 217 K/UL — SIGNIFICANT CHANGE UP (ref 150–400)
PLATELET MAPPING ACTF MAX AMPLITUDE: 20.8 MM — HIGH (ref 2–19)
PLATELET MAPPING ADP MAXIMUM AMPLITUDE: 22.2 MM — LOW (ref 45–69)
PLATELET MAPPING ADP PERCENT INHIBITION: 96 % — HIGH (ref 0–17)
PLATELET MAPPING ARACHIDONIC ACID INHIBITION: 98.6 % — HIGH (ref 0–11)
PLATELET MAPPING HKH MAXIMUM AMPLITUDE: 55.6 MM — SIGNIFICANT CHANGE UP (ref 53–68)
PMV BLD: 9.8 FL — SIGNIFICANT CHANGE UP (ref 7–13)
POTASSIUM SERPL-MCNC: 4.2 MMOL/L — SIGNIFICANT CHANGE UP (ref 3.5–5.3)
POTASSIUM SERPL-SCNC: 4.2 MMOL/L — SIGNIFICANT CHANGE UP (ref 3.5–5.3)
PROT SERPL-MCNC: 7.5 G/DL — SIGNIFICANT CHANGE UP (ref 6–8.3)
PROTHROM AB SERPL-ACNC: 12.2 SEC — SIGNIFICANT CHANGE UP (ref 9.9–13.4)
PROTHROM AB SERPL-ACNC: 12.8 SEC — SIGNIFICANT CHANGE UP (ref 9.9–13.4)
RBC # BLD: 5.35 M/UL — SIGNIFICANT CHANGE UP (ref 4.2–5.8)
RBC # FLD: 12.7 % — SIGNIFICANT CHANGE UP (ref 10.3–14.5)
SODIUM SERPL-SCNC: 138 MMOL/L — SIGNIFICANT CHANGE UP (ref 135–145)
WBC # BLD: 9.23 K/UL — SIGNIFICANT CHANGE UP (ref 3.8–10.5)
WBC # FLD AUTO: 9.23 K/UL — SIGNIFICANT CHANGE UP (ref 3.8–10.5)

## 2025-08-11 PROCEDURE — 81001 URINALYSIS AUTO W/SCOPE: CPT

## 2025-08-11 PROCEDURE — C1889: CPT

## 2025-08-11 PROCEDURE — 85610 PROTHROMBIN TIME: CPT

## 2025-08-11 PROCEDURE — 85730 THROMBOPLASTIN TIME PARTIAL: CPT

## 2025-08-11 PROCEDURE — 33967 INSERT I-AORT PERCUT DEVICE: CPT

## 2025-08-11 PROCEDURE — 84484 ASSAY OF TROPONIN QUANT: CPT

## 2025-08-11 PROCEDURE — 36415 COLL VENOUS BLD VENIPUNCTURE: CPT

## 2025-08-11 PROCEDURE — 84100 ASSAY OF PHOSPHORUS: CPT

## 2025-08-11 PROCEDURE — 83036 HEMOGLOBIN GLYCOSYLATED A1C: CPT

## 2025-08-11 PROCEDURE — 85396 CLOTTING ASSAY WHOLE BLOOD: CPT

## 2025-08-11 PROCEDURE — 93880 EXTRACRANIAL BILAT STUDY: CPT

## 2025-08-11 PROCEDURE — 85014 HEMATOCRIT: CPT

## 2025-08-11 PROCEDURE — 83605 ASSAY OF LACTIC ACID: CPT

## 2025-08-11 PROCEDURE — 86850 RBC ANTIBODY SCREEN: CPT

## 2025-08-11 PROCEDURE — 99291 CRITICAL CARE FIRST HOUR: CPT | Mod: 25

## 2025-08-11 PROCEDURE — 93005 ELECTROCARDIOGRAM TRACING: CPT

## 2025-08-11 PROCEDURE — 82803 BLOOD GASES ANY COMBINATION: CPT

## 2025-08-11 PROCEDURE — C1887: CPT

## 2025-08-11 PROCEDURE — C8929: CPT

## 2025-08-11 PROCEDURE — 84295 ASSAY OF SERUM SODIUM: CPT

## 2025-08-11 PROCEDURE — 71045 X-RAY EXAM CHEST 1 VIEW: CPT | Mod: 26

## 2025-08-11 PROCEDURE — 85025 COMPLETE CBC W/AUTO DIFF WBC: CPT

## 2025-08-11 PROCEDURE — 71045 X-RAY EXAM CHEST 1 VIEW: CPT

## 2025-08-11 PROCEDURE — 80053 COMPREHEN METABOLIC PANEL: CPT

## 2025-08-11 PROCEDURE — 86901 BLOOD TYPING SEROLOGIC RH(D): CPT

## 2025-08-11 PROCEDURE — 85576 BLOOD PLATELET AGGREGATION: CPT

## 2025-08-11 PROCEDURE — 83735 ASSAY OF MAGNESIUM: CPT

## 2025-08-11 PROCEDURE — 94010 BREATHING CAPACITY TEST: CPT

## 2025-08-11 PROCEDURE — C1894: CPT

## 2025-08-11 PROCEDURE — C1769: CPT

## 2025-08-11 PROCEDURE — 82947 ASSAY GLUCOSE BLOOD QUANT: CPT

## 2025-08-11 PROCEDURE — 85018 HEMOGLOBIN: CPT

## 2025-08-11 PROCEDURE — 85520 HEPARIN ASSAY: CPT

## 2025-08-11 PROCEDURE — 83880 ASSAY OF NATRIURETIC PEPTIDE: CPT

## 2025-08-11 PROCEDURE — 92929: CPT | Mod: LC

## 2025-08-11 PROCEDURE — 85027 COMPLETE CBC AUTOMATED: CPT

## 2025-08-11 PROCEDURE — 76604 US EXAM CHEST: CPT

## 2025-08-11 PROCEDURE — 86900 BLOOD TYPING SEROLOGIC ABO: CPT

## 2025-08-11 PROCEDURE — 94010 BREATHING CAPACITY TEST: CPT | Mod: 26

## 2025-08-11 PROCEDURE — 80061 LIPID PANEL: CPT

## 2025-08-11 PROCEDURE — 92928 PRQ TCAT PLMT NTRAC ST 1 LES: CPT | Mod: LC

## 2025-08-11 PROCEDURE — 84443 ASSAY THYROID STIM HORMONE: CPT

## 2025-08-11 PROCEDURE — 82435 ASSAY OF BLOOD CHLORIDE: CPT

## 2025-08-11 PROCEDURE — 84132 ASSAY OF SERUM POTASSIUM: CPT

## 2025-08-11 PROCEDURE — 82330 ASSAY OF CALCIUM: CPT

## 2025-08-11 PROCEDURE — 99152 MOD SED SAME PHYS/QHP 5/>YRS: CPT

## 2025-08-11 PROCEDURE — 93458 L HRT ARTERY/VENTRICLE ANGIO: CPT

## 2025-08-11 PROCEDURE — 99292 CRITICAL CARE ADDL 30 MIN: CPT

## 2025-08-11 RX ORDER — POLYETHYLENE GLYCOL 3350 17 G/17G
17 POWDER, FOR SOLUTION ORAL DAILY
Refills: 0 | Status: DISCONTINUED | OUTPATIENT
Start: 2025-08-11 | End: 2025-08-14

## 2025-08-11 RX ORDER — CLOPIDOGREL BISULFATE 75 MG/1
600 TABLET, FILM COATED ORAL ONCE
Refills: 0 | Status: COMPLETED | OUTPATIENT
Start: 2025-08-11 | End: 2025-08-11

## 2025-08-11 RX ORDER — CLOPIDOGREL BISULFATE 75 MG/1
75 TABLET, FILM COATED ORAL DAILY
Refills: 0 | Status: DISCONTINUED | OUTPATIENT
Start: 2025-08-12 | End: 2025-08-14

## 2025-08-11 RX ORDER — FENTANYL CITRATE-0.9 % NACL/PF 100MCG/2ML
25 SYRINGE (ML) INTRAVENOUS ONCE
Refills: 0 | Status: DISCONTINUED | OUTPATIENT
Start: 2025-08-11 | End: 2025-08-11

## 2025-08-11 RX ADMIN — Medication 1 APPLICATION(S): at 06:38

## 2025-08-11 RX ADMIN — CARVEDILOL 6.25 MILLIGRAM(S): 3.12 TABLET, FILM COATED ORAL at 05:58

## 2025-08-11 RX ADMIN — ATORVASTATIN CALCIUM 80 MILLIGRAM(S): 80 TABLET, FILM COATED ORAL at 21:22

## 2025-08-11 RX ADMIN — HEPARIN SODIUM 10 UNIT(S)/HR: 1000 INJECTION INTRAVENOUS; SUBCUTANEOUS at 20:11

## 2025-08-11 RX ADMIN — CLOPIDOGREL BISULFATE 600 MILLIGRAM(S): 75 TABLET, FILM COATED ORAL at 08:43

## 2025-08-11 RX ADMIN — HEPARIN SODIUM 10 UNIT(S)/HR: 1000 INJECTION INTRAVENOUS; SUBCUTANEOUS at 08:44

## 2025-08-11 RX ADMIN — CARVEDILOL 6.25 MILLIGRAM(S): 3.12 TABLET, FILM COATED ORAL at 17:24

## 2025-08-11 RX ADMIN — Medication 1 MILLILITER(S): at 05:59

## 2025-08-11 RX ADMIN — Medication 25 MICROGRAM(S): at 13:28

## 2025-08-11 RX ADMIN — Medication 25 MICROGRAM(S): at 13:57

## 2025-08-11 RX ADMIN — HEPARIN SODIUM 12 UNIT(S)/HR: 1000 INJECTION INTRAVENOUS; SUBCUTANEOUS at 01:37

## 2025-08-11 RX ADMIN — Medication 81 MILLIGRAM(S): at 09:49

## 2025-08-11 RX ADMIN — HEPARIN SODIUM 12 UNIT(S)/HR: 1000 INJECTION INTRAVENOUS; SUBCUTANEOUS at 07:40

## 2025-08-12 ENCOUNTER — TRANSCRIPTION ENCOUNTER (OUTPATIENT)
Age: 55
End: 2025-08-12

## 2025-08-12 LAB
ALBUMIN SERPL ELPH-MCNC: 3.8 G/DL — SIGNIFICANT CHANGE UP (ref 3.3–5)
ALP SERPL-CCNC: 34 U/L — LOW (ref 40–120)
ALT FLD-CCNC: 60 U/L — HIGH (ref 10–45)
ANION GAP SERPL CALC-SCNC: 14 MMOL/L — SIGNIFICANT CHANGE UP (ref 5–17)
APTT BLD: 68.2 SEC — HIGH (ref 26.1–36.8)
APTT BLD: 88.5 SEC — HIGH (ref 26.1–36.8)
AST SERPL-CCNC: 37 U/L — SIGNIFICANT CHANGE UP (ref 10–40)
BILIRUB SERPL-MCNC: 1.1 MG/DL — SIGNIFICANT CHANGE UP (ref 0.2–1.2)
BUN SERPL-MCNC: 16 MG/DL — SIGNIFICANT CHANGE UP (ref 7–23)
CALCIUM SERPL-MCNC: 9.3 MG/DL — SIGNIFICANT CHANGE UP (ref 8.4–10.5)
CHLORIDE SERPL-SCNC: 99 MMOL/L — SIGNIFICANT CHANGE UP (ref 96–108)
CO2 SERPL-SCNC: 22 MMOL/L — SIGNIFICANT CHANGE UP (ref 22–31)
CREAT SERPL-MCNC: 1.09 MG/DL — SIGNIFICANT CHANGE UP (ref 0.5–1.3)
EGFR: 82 ML/MIN/1.73M2 — SIGNIFICANT CHANGE UP
EGFR: 82 ML/MIN/1.73M2 — SIGNIFICANT CHANGE UP
GAS PNL BLDV: SIGNIFICANT CHANGE UP
GLUCOSE SERPL-MCNC: 131 MG/DL — HIGH (ref 70–99)
HCT VFR BLD CALC: 42.7 % — SIGNIFICANT CHANGE UP (ref 39–50)
HGB BLD-MCNC: 14.6 G/DL — SIGNIFICANT CHANGE UP (ref 13–17)
MAGNESIUM SERPL-MCNC: 1.9 MG/DL — SIGNIFICANT CHANGE UP (ref 1.6–2.6)
MCHC RBC-ENTMCNC: 29.3 PG — SIGNIFICANT CHANGE UP (ref 27–34)
MCHC RBC-ENTMCNC: 34.2 G/DL — SIGNIFICANT CHANGE UP (ref 32–36)
MCV RBC AUTO: 85.7 FL — SIGNIFICANT CHANGE UP (ref 80–100)
NRBC # BLD AUTO: 0 K/UL — SIGNIFICANT CHANGE UP (ref 0–0)
NRBC # FLD: 0 K/UL — SIGNIFICANT CHANGE UP (ref 0–0)
NRBC BLD AUTO-RTO: 0 /100 WBCS — SIGNIFICANT CHANGE UP (ref 0–0)
PHOSPHATE SERPL-MCNC: 3.7 MG/DL — SIGNIFICANT CHANGE UP (ref 2.5–4.5)
PLATELET # BLD AUTO: 206 K/UL — SIGNIFICANT CHANGE UP (ref 150–400)
PMV BLD: 9.8 FL — SIGNIFICANT CHANGE UP (ref 7–13)
POTASSIUM SERPL-MCNC: 3.9 MMOL/L — SIGNIFICANT CHANGE UP (ref 3.5–5.3)
POTASSIUM SERPL-SCNC: 3.9 MMOL/L — SIGNIFICANT CHANGE UP (ref 3.5–5.3)
PROT SERPL-MCNC: 7.4 G/DL — SIGNIFICANT CHANGE UP (ref 6–8.3)
RBC # BLD: 4.98 M/UL — SIGNIFICANT CHANGE UP (ref 4.2–5.8)
RBC # FLD: 12.6 % — SIGNIFICANT CHANGE UP (ref 10.3–14.5)
SODIUM SERPL-SCNC: 135 MMOL/L — SIGNIFICANT CHANGE UP (ref 135–145)
WBC # BLD: 8.4 K/UL — SIGNIFICANT CHANGE UP (ref 3.8–10.5)
WBC # FLD AUTO: 8.4 K/UL — SIGNIFICANT CHANGE UP (ref 3.8–10.5)

## 2025-08-12 PROCEDURE — 99232 SBSQ HOSP IP/OBS MODERATE 35: CPT | Mod: 25

## 2025-08-12 PROCEDURE — 99291 CRITICAL CARE FIRST HOUR: CPT | Mod: 25

## 2025-08-12 PROCEDURE — 99292 CRITICAL CARE ADDL 30 MIN: CPT | Mod: 25

## 2025-08-12 PROCEDURE — 71045 X-RAY EXAM CHEST 1 VIEW: CPT | Mod: 26

## 2025-08-12 PROCEDURE — 33968 REMOVE AORTIC ASSIST DEVICE: CPT

## 2025-08-12 PROCEDURE — 93010 ELECTROCARDIOGRAM REPORT: CPT

## 2025-08-12 PROCEDURE — 92928 PRQ TCAT PLMT NTRAC ST 1 LES: CPT | Mod: LD

## 2025-08-12 PROCEDURE — 99152 MOD SED SAME PHYS/QHP 5/>YRS: CPT

## 2025-08-12 RX ORDER — FENTANYL CITRATE-0.9 % NACL/PF 100MCG/2ML
50 SYRINGE (ML) INTRAVENOUS ONCE
Refills: 0 | Status: DISCONTINUED | OUTPATIENT
Start: 2025-08-12 | End: 2025-08-12

## 2025-08-12 RX ORDER — HEPARIN SODIUM 1000 [USP'U]/ML
5000 INJECTION INTRAVENOUS; SUBCUTANEOUS EVERY 8 HOURS
Refills: 0 | Status: DISCONTINUED | OUTPATIENT
Start: 2025-08-13 | End: 2025-08-14

## 2025-08-12 RX ORDER — CARVEDILOL 3.12 MG/1
6.25 TABLET, FILM COATED ORAL EVERY 12 HOURS
Refills: 0 | Status: DISCONTINUED | OUTPATIENT
Start: 2025-08-13 | End: 2025-08-14

## 2025-08-12 RX ORDER — MAGNESIUM SULFATE 500 MG/ML
1 SYRINGE (ML) INJECTION ONCE
Refills: 0 | Status: COMPLETED | OUTPATIENT
Start: 2025-08-12 | End: 2025-08-12

## 2025-08-12 RX ORDER — NITROGLYCERIN 20 MG/G
5 OINTMENT TOPICAL
Qty: 50 | Refills: 0 | Status: DISCONTINUED | OUTPATIENT
Start: 2025-08-12 | End: 2025-08-12

## 2025-08-12 RX ADMIN — NITROGLYCERIN 1.5 MICROGRAM(S)/MIN: 20 OINTMENT TOPICAL at 19:25

## 2025-08-12 RX ADMIN — Medication 500 MILLILITER(S): at 09:13

## 2025-08-12 RX ADMIN — Medication 2 MILLIGRAM(S): at 21:23

## 2025-08-12 RX ADMIN — CLOPIDOGREL BISULFATE 75 MILLIGRAM(S): 75 TABLET, FILM COATED ORAL at 12:52

## 2025-08-12 RX ADMIN — ATORVASTATIN CALCIUM 80 MILLIGRAM(S): 80 TABLET, FILM COATED ORAL at 21:23

## 2025-08-12 RX ADMIN — Medication 50 MICROGRAM(S): at 08:40

## 2025-08-12 RX ADMIN — Medication 20 MILLIEQUIVALENT(S): at 05:26

## 2025-08-12 RX ADMIN — Medication 1 APPLICATION(S): at 05:26

## 2025-08-12 RX ADMIN — CARVEDILOL 6.25 MILLIGRAM(S): 3.12 TABLET, FILM COATED ORAL at 05:26

## 2025-08-12 RX ADMIN — Medication 0.5 MILLIGRAM(S): at 19:25

## 2025-08-12 RX ADMIN — Medication 2 MILLIGRAM(S): at 21:38

## 2025-08-12 RX ADMIN — Medication 1000 MILLILITER(S): at 18:26

## 2025-08-12 RX ADMIN — Medication 50 MICROGRAM(S): at 10:48

## 2025-08-12 RX ADMIN — Medication 100 GRAM(S): at 03:20

## 2025-08-12 RX ADMIN — Medication 81 MILLIGRAM(S): at 12:53

## 2025-08-12 RX ADMIN — Medication 0.5 MILLIGRAM(S): at 20:00

## 2025-08-13 LAB
ALBUMIN SERPL ELPH-MCNC: 3.8 G/DL — SIGNIFICANT CHANGE UP (ref 3.3–5)
ALP SERPL-CCNC: 34 U/L — LOW (ref 40–120)
ALT FLD-CCNC: 69 U/L — HIGH (ref 10–45)
ANION GAP SERPL CALC-SCNC: 17 MMOL/L — SIGNIFICANT CHANGE UP (ref 5–17)
AST SERPL-CCNC: 51 U/L — HIGH (ref 10–40)
BILIRUB SERPL-MCNC: 1.3 MG/DL — HIGH (ref 0.2–1.2)
BUN SERPL-MCNC: 13 MG/DL — SIGNIFICANT CHANGE UP (ref 7–23)
CALCIUM SERPL-MCNC: 9.6 MG/DL — SIGNIFICANT CHANGE UP (ref 8.4–10.5)
CHLORIDE SERPL-SCNC: 100 MMOL/L — SIGNIFICANT CHANGE UP (ref 96–108)
CO2 SERPL-SCNC: 19 MMOL/L — LOW (ref 22–31)
CREAT SERPL-MCNC: 0.82 MG/DL — SIGNIFICANT CHANGE UP (ref 0.5–1.3)
EGFR: 106 ML/MIN/1.73M2 — SIGNIFICANT CHANGE UP
EGFR: 106 ML/MIN/1.73M2 — SIGNIFICANT CHANGE UP
GLUCOSE SERPL-MCNC: 114 MG/DL — HIGH (ref 70–99)
HCT VFR BLD CALC: 42.9 % — SIGNIFICANT CHANGE UP (ref 39–50)
HGB BLD-MCNC: 14.7 G/DL — SIGNIFICANT CHANGE UP (ref 13–17)
MAGNESIUM SERPL-MCNC: 2 MG/DL — SIGNIFICANT CHANGE UP (ref 1.6–2.6)
MCHC RBC-ENTMCNC: 28.9 PG — SIGNIFICANT CHANGE UP (ref 27–34)
MCHC RBC-ENTMCNC: 34.3 G/DL — SIGNIFICANT CHANGE UP (ref 32–36)
MCV RBC AUTO: 84.3 FL — SIGNIFICANT CHANGE UP (ref 80–100)
NRBC # BLD AUTO: 0 K/UL — SIGNIFICANT CHANGE UP (ref 0–0)
NRBC # FLD: 0 K/UL — SIGNIFICANT CHANGE UP (ref 0–0)
NRBC BLD AUTO-RTO: 0 /100 WBCS — SIGNIFICANT CHANGE UP (ref 0–0)
PHOSPHATE SERPL-MCNC: 3.6 MG/DL — SIGNIFICANT CHANGE UP (ref 2.5–4.5)
PLATELET # BLD AUTO: 218 K/UL — SIGNIFICANT CHANGE UP (ref 150–400)
PMV BLD: 9.8 FL — SIGNIFICANT CHANGE UP (ref 7–13)
POTASSIUM SERPL-MCNC: 4.1 MMOL/L — SIGNIFICANT CHANGE UP (ref 3.5–5.3)
POTASSIUM SERPL-SCNC: 4.1 MMOL/L — SIGNIFICANT CHANGE UP (ref 3.5–5.3)
PROT SERPL-MCNC: 7.8 G/DL — SIGNIFICANT CHANGE UP (ref 6–8.3)
RBC # BLD: 5.09 M/UL — SIGNIFICANT CHANGE UP (ref 4.2–5.8)
RBC # FLD: 12.2 % — SIGNIFICANT CHANGE UP (ref 10.3–14.5)
SODIUM SERPL-SCNC: 136 MMOL/L — SIGNIFICANT CHANGE UP (ref 135–145)
WBC # BLD: 8.88 K/UL — SIGNIFICANT CHANGE UP (ref 3.8–10.5)
WBC # FLD AUTO: 8.88 K/UL — SIGNIFICANT CHANGE UP (ref 3.8–10.5)

## 2025-08-13 PROCEDURE — 99232 SBSQ HOSP IP/OBS MODERATE 35: CPT

## 2025-08-13 PROCEDURE — 93010 ELECTROCARDIOGRAM REPORT: CPT

## 2025-08-13 PROCEDURE — 99291 CRITICAL CARE FIRST HOUR: CPT | Mod: 25

## 2025-08-13 RX ORDER — COLCHICINE 0.6 MG/1
0.6 TABLET, FILM COATED ORAL
Refills: 0 | Status: DISCONTINUED | OUTPATIENT
Start: 2025-08-13 | End: 2025-08-14

## 2025-08-13 RX ORDER — ACETAMINOPHEN 500 MG/5ML
650 LIQUID (ML) ORAL EVERY 6 HOURS
Refills: 0 | Status: DISCONTINUED | OUTPATIENT
Start: 2025-08-13 | End: 2025-08-14

## 2025-08-13 RX ORDER — ACETAMINOPHEN 500 MG/5ML
1000 LIQUID (ML) ORAL ONCE
Refills: 0 | Status: COMPLETED | OUTPATIENT
Start: 2025-08-13 | End: 2025-08-13

## 2025-08-13 RX ADMIN — HEPARIN SODIUM 5000 UNIT(S): 1000 INJECTION INTRAVENOUS; SUBCUTANEOUS at 14:21

## 2025-08-13 RX ADMIN — Medication 81 MILLIGRAM(S): at 11:58

## 2025-08-13 RX ADMIN — Medication 650 MILLIGRAM(S): at 18:45

## 2025-08-13 RX ADMIN — CLOPIDOGREL BISULFATE 75 MILLIGRAM(S): 75 TABLET, FILM COATED ORAL at 11:58

## 2025-08-13 RX ADMIN — COLCHICINE 0.6 MILLIGRAM(S): 0.6 TABLET, FILM COATED ORAL at 15:35

## 2025-08-13 RX ADMIN — Medication 0.5 MILLIGRAM(S): at 17:30

## 2025-08-13 RX ADMIN — Medication 650 MILLIGRAM(S): at 17:57

## 2025-08-13 RX ADMIN — Medication 400 MILLIGRAM(S): at 05:39

## 2025-08-13 RX ADMIN — Medication 0.5 MILLIGRAM(S): at 13:24

## 2025-08-13 RX ADMIN — Medication 0.5 MILLIGRAM(S): at 17:45

## 2025-08-13 RX ADMIN — Medication 650 MILLIGRAM(S): at 13:00

## 2025-08-13 RX ADMIN — HEPARIN SODIUM 5000 UNIT(S): 1000 INJECTION INTRAVENOUS; SUBCUTANEOUS at 21:04

## 2025-08-13 RX ADMIN — Medication 2 MILLIGRAM(S): at 01:30

## 2025-08-13 RX ADMIN — Medication 1 APPLICATION(S): at 05:39

## 2025-08-13 RX ADMIN — Medication 0.5 MILLIGRAM(S): at 13:45

## 2025-08-13 RX ADMIN — HEPARIN SODIUM 5000 UNIT(S): 1000 INJECTION INTRAVENOUS; SUBCUTANEOUS at 05:38

## 2025-08-13 RX ADMIN — Medication 1000 MILLIGRAM(S): at 05:55

## 2025-08-13 RX ADMIN — Medication 2 MILLIGRAM(S): at 01:45

## 2025-08-13 RX ADMIN — ATORVASTATIN CALCIUM 80 MILLIGRAM(S): 80 TABLET, FILM COATED ORAL at 21:04

## 2025-08-13 RX ADMIN — CARVEDILOL 6.25 MILLIGRAM(S): 3.12 TABLET, FILM COATED ORAL at 17:11

## 2025-08-13 RX ADMIN — CARVEDILOL 6.25 MILLIGRAM(S): 3.12 TABLET, FILM COATED ORAL at 05:39

## 2025-08-13 RX ADMIN — Medication 650 MILLIGRAM(S): at 12:09

## 2025-08-14 ENCOUNTER — TRANSCRIPTION ENCOUNTER (OUTPATIENT)
Age: 55
End: 2025-08-14

## 2025-08-14 VITALS
OXYGEN SATURATION: 96 % | RESPIRATION RATE: 22 BRPM | DIASTOLIC BLOOD PRESSURE: 72 MMHG | TEMPERATURE: 99 F | SYSTOLIC BLOOD PRESSURE: 115 MMHG | HEART RATE: 79 BPM

## 2025-08-14 LAB
ALBUMIN SERPL ELPH-MCNC: 3.9 G/DL — SIGNIFICANT CHANGE UP (ref 3.3–5)
ALBUMIN SERPL ELPH-MCNC: 4 G/DL — SIGNIFICANT CHANGE UP (ref 3.3–5)
ALBUMIN SERPL ELPH-MCNC: 4.2 G/DL — SIGNIFICANT CHANGE UP (ref 3.3–5)
ALP SERPL-CCNC: 35 U/L — LOW (ref 40–120)
ALP SERPL-CCNC: 38 U/L — LOW (ref 40–120)
ALP SERPL-CCNC: 39 U/L — LOW (ref 40–120)
ALT FLD-CCNC: 112 U/L — HIGH (ref 10–45)
ALT FLD-CCNC: 115 U/L — HIGH (ref 10–45)
ALT FLD-CCNC: 124 U/L — HIGH (ref 10–45)
ANION GAP SERPL CALC-SCNC: 15 MMOL/L — SIGNIFICANT CHANGE UP (ref 5–17)
AST SERPL-CCNC: 105 U/L — HIGH (ref 10–40)
AST SERPL-CCNC: 124 U/L — HIGH (ref 10–40)
AST SERPL-CCNC: 137 U/L — HIGH (ref 10–40)
BILIRUB SERPL-MCNC: 1.3 MG/DL — HIGH (ref 0.2–1.2)
BILIRUB SERPL-MCNC: 1.4 MG/DL — HIGH (ref 0.2–1.2)
BILIRUB SERPL-MCNC: 1.4 MG/DL — HIGH (ref 0.2–1.2)
BUN SERPL-MCNC: 13 MG/DL — SIGNIFICANT CHANGE UP (ref 7–23)
BUN SERPL-MCNC: 14 MG/DL — SIGNIFICANT CHANGE UP (ref 7–23)
BUN SERPL-MCNC: 15 MG/DL — SIGNIFICANT CHANGE UP (ref 7–23)
CALCIUM SERPL-MCNC: 9.5 MG/DL — SIGNIFICANT CHANGE UP (ref 8.4–10.5)
CALCIUM SERPL-MCNC: 9.8 MG/DL — SIGNIFICANT CHANGE UP (ref 8.4–10.5)
CALCIUM SERPL-MCNC: 9.8 MG/DL — SIGNIFICANT CHANGE UP (ref 8.4–10.5)
CHLORIDE SERPL-SCNC: 97 MMOL/L — SIGNIFICANT CHANGE UP (ref 96–108)
CHLORIDE SERPL-SCNC: 98 MMOL/L — SIGNIFICANT CHANGE UP (ref 96–108)
CHLORIDE SERPL-SCNC: 98 MMOL/L — SIGNIFICANT CHANGE UP (ref 96–108)
CO2 SERPL-SCNC: 21 MMOL/L — LOW (ref 22–31)
CO2 SERPL-SCNC: 22 MMOL/L — SIGNIFICANT CHANGE UP (ref 22–31)
CO2 SERPL-SCNC: 23 MMOL/L — SIGNIFICANT CHANGE UP (ref 22–31)
CREAT SERPL-MCNC: 0.78 MG/DL — SIGNIFICANT CHANGE UP (ref 0.5–1.3)
CREAT SERPL-MCNC: 0.88 MG/DL — SIGNIFICANT CHANGE UP (ref 0.5–1.3)
CREAT SERPL-MCNC: 0.88 MG/DL — SIGNIFICANT CHANGE UP (ref 0.5–1.3)
EGFR: 103 ML/MIN/1.73M2 — SIGNIFICANT CHANGE UP
EGFR: 107 ML/MIN/1.73M2 — SIGNIFICANT CHANGE UP
EGFR: 107 ML/MIN/1.73M2 — SIGNIFICANT CHANGE UP
GLUCOSE SERPL-MCNC: 117 MG/DL — HIGH (ref 70–99)
GLUCOSE SERPL-MCNC: 129 MG/DL — HIGH (ref 70–99)
GLUCOSE SERPL-MCNC: 138 MG/DL — HIGH (ref 70–99)
HCT VFR BLD CALC: 46.2 % — SIGNIFICANT CHANGE UP (ref 39–50)
HGB BLD-MCNC: 15.7 G/DL — SIGNIFICANT CHANGE UP (ref 13–17)
MAGNESIUM SERPL-MCNC: 1.9 MG/DL — SIGNIFICANT CHANGE UP (ref 1.6–2.6)
MAGNESIUM SERPL-MCNC: 2.4 MG/DL — SIGNIFICANT CHANGE UP (ref 1.6–2.6)
MCHC RBC-ENTMCNC: 28.5 PG — SIGNIFICANT CHANGE UP (ref 27–34)
MCHC RBC-ENTMCNC: 34 G/DL — SIGNIFICANT CHANGE UP (ref 32–36)
MCV RBC AUTO: 83.8 FL — SIGNIFICANT CHANGE UP (ref 80–100)
NRBC # BLD AUTO: 0 K/UL — SIGNIFICANT CHANGE UP (ref 0–0)
NRBC # FLD: 0 K/UL — SIGNIFICANT CHANGE UP (ref 0–0)
NRBC BLD AUTO-RTO: 0 /100 WBCS — SIGNIFICANT CHANGE UP (ref 0–0)
PHOSPHATE SERPL-MCNC: 3.4 MG/DL — SIGNIFICANT CHANGE UP (ref 2.5–4.5)
PHOSPHATE SERPL-MCNC: 3.9 MG/DL — SIGNIFICANT CHANGE UP (ref 2.5–4.5)
PLATELET # BLD AUTO: 246 K/UL — SIGNIFICANT CHANGE UP (ref 150–400)
PMV BLD: 9.8 FL — SIGNIFICANT CHANGE UP (ref 7–13)
POTASSIUM SERPL-MCNC: 4 MMOL/L — SIGNIFICANT CHANGE UP (ref 3.5–5.3)
POTASSIUM SERPL-MCNC: 4.1 MMOL/L — SIGNIFICANT CHANGE UP (ref 3.5–5.3)
POTASSIUM SERPL-MCNC: 4.3 MMOL/L — SIGNIFICANT CHANGE UP (ref 3.5–5.3)
POTASSIUM SERPL-SCNC: 4 MMOL/L — SIGNIFICANT CHANGE UP (ref 3.5–5.3)
POTASSIUM SERPL-SCNC: 4.1 MMOL/L — SIGNIFICANT CHANGE UP (ref 3.5–5.3)
POTASSIUM SERPL-SCNC: 4.3 MMOL/L — SIGNIFICANT CHANGE UP (ref 3.5–5.3)
PROT SERPL-MCNC: 7.9 G/DL — SIGNIFICANT CHANGE UP (ref 6–8.3)
PROT SERPL-MCNC: 8.1 G/DL — SIGNIFICANT CHANGE UP (ref 6–8.3)
PROT SERPL-MCNC: 8.7 G/DL — HIGH (ref 6–8.3)
RBC # BLD: 5.51 M/UL — SIGNIFICANT CHANGE UP (ref 4.2–5.8)
RBC # FLD: 12.1 % — SIGNIFICANT CHANGE UP (ref 10.3–14.5)
SODIUM SERPL-SCNC: 134 MMOL/L — LOW (ref 135–145)
SODIUM SERPL-SCNC: 135 MMOL/L — SIGNIFICANT CHANGE UP (ref 135–145)
SODIUM SERPL-SCNC: 135 MMOL/L — SIGNIFICANT CHANGE UP (ref 135–145)
WBC # BLD: 10.14 K/UL — SIGNIFICANT CHANGE UP (ref 3.8–10.5)
WBC # FLD AUTO: 10.14 K/UL — SIGNIFICANT CHANGE UP (ref 3.8–10.5)

## 2025-08-14 PROCEDURE — 80061 LIPID PANEL: CPT

## 2025-08-14 PROCEDURE — C1874: CPT

## 2025-08-14 PROCEDURE — 82435 ASSAY OF BLOOD CHLORIDE: CPT

## 2025-08-14 PROCEDURE — 76604 US EXAM CHEST: CPT

## 2025-08-14 PROCEDURE — 94010 BREATHING CAPACITY TEST: CPT

## 2025-08-14 PROCEDURE — 82330 ASSAY OF CALCIUM: CPT

## 2025-08-14 PROCEDURE — 71045 X-RAY EXAM CHEST 1 VIEW: CPT

## 2025-08-14 PROCEDURE — C9600: CPT | Mod: LC

## 2025-08-14 PROCEDURE — C1769: CPT

## 2025-08-14 PROCEDURE — 84100 ASSAY OF PHOSPHORUS: CPT

## 2025-08-14 PROCEDURE — C9601: CPT | Mod: LC

## 2025-08-14 PROCEDURE — C1753: CPT

## 2025-08-14 PROCEDURE — 85610 PROTHROMBIN TIME: CPT

## 2025-08-14 PROCEDURE — 99239 HOSP IP/OBS DSCHRG MGMT >30: CPT | Mod: 25

## 2025-08-14 PROCEDURE — 85014 HEMATOCRIT: CPT

## 2025-08-14 PROCEDURE — 83880 ASSAY OF NATRIURETIC PEPTIDE: CPT

## 2025-08-14 PROCEDURE — 96374 THER/PROPH/DIAG INJ IV PUSH: CPT

## 2025-08-14 PROCEDURE — 84484 ASSAY OF TROPONIN QUANT: CPT

## 2025-08-14 PROCEDURE — 85027 COMPLETE CBC AUTOMATED: CPT

## 2025-08-14 PROCEDURE — 93005 ELECTROCARDIOGRAM TRACING: CPT

## 2025-08-14 PROCEDURE — C1894: CPT

## 2025-08-14 PROCEDURE — 85396 CLOTTING ASSAY WHOLE BLOOD: CPT

## 2025-08-14 PROCEDURE — 36415 COLL VENOUS BLD VENIPUNCTURE: CPT

## 2025-08-14 PROCEDURE — 93010 ELECTROCARDIOGRAM REPORT: CPT

## 2025-08-14 PROCEDURE — 83605 ASSAY OF LACTIC ACID: CPT

## 2025-08-14 PROCEDURE — 85520 HEPARIN ASSAY: CPT

## 2025-08-14 PROCEDURE — 82803 BLOOD GASES ANY COMBINATION: CPT

## 2025-08-14 PROCEDURE — 85576 BLOOD PLATELET AGGREGATION: CPT

## 2025-08-14 PROCEDURE — 93458 L HRT ARTERY/VENTRICLE ANGIO: CPT

## 2025-08-14 PROCEDURE — C1887: CPT

## 2025-08-14 PROCEDURE — 84295 ASSAY OF SERUM SODIUM: CPT

## 2025-08-14 PROCEDURE — 80053 COMPREHEN METABOLIC PANEL: CPT

## 2025-08-14 PROCEDURE — 82947 ASSAY GLUCOSE BLOOD QUANT: CPT

## 2025-08-14 PROCEDURE — C1889: CPT

## 2025-08-14 PROCEDURE — 93880 EXTRACRANIAL BILAT STUDY: CPT

## 2025-08-14 PROCEDURE — 83735 ASSAY OF MAGNESIUM: CPT

## 2025-08-14 PROCEDURE — 81001 URINALYSIS AUTO W/SCOPE: CPT

## 2025-08-14 PROCEDURE — 86901 BLOOD TYPING SEROLOGIC RH(D): CPT

## 2025-08-14 PROCEDURE — 86850 RBC ANTIBODY SCREEN: CPT

## 2025-08-14 PROCEDURE — C8929: CPT

## 2025-08-14 PROCEDURE — 99285 EMERGENCY DEPT VISIT HI MDM: CPT | Mod: 25

## 2025-08-14 PROCEDURE — 85730 THROMBOPLASTIN TIME PARTIAL: CPT

## 2025-08-14 PROCEDURE — 84132 ASSAY OF SERUM POTASSIUM: CPT

## 2025-08-14 PROCEDURE — 85025 COMPLETE CBC W/AUTO DIFF WBC: CPT

## 2025-08-14 PROCEDURE — 84443 ASSAY THYROID STIM HORMONE: CPT

## 2025-08-14 PROCEDURE — 85018 HEMOGLOBIN: CPT

## 2025-08-14 PROCEDURE — C1725: CPT

## 2025-08-14 PROCEDURE — 33967 INSERT I-AORT PERCUT DEVICE: CPT

## 2025-08-14 PROCEDURE — 86900 BLOOD TYPING SEROLOGIC ABO: CPT

## 2025-08-14 PROCEDURE — 83036 HEMOGLOBIN GLYCOSYLATED A1C: CPT

## 2025-08-14 RX ORDER — ATORVASTATIN CALCIUM 80 MG/1
1 TABLET, FILM COATED ORAL
Qty: 30 | Refills: 0
Start: 2025-08-14 | End: 2025-09-12

## 2025-08-14 RX ORDER — MAGNESIUM SULFATE 500 MG/ML
1 SYRINGE (ML) INJECTION ONCE
Refills: 0 | Status: COMPLETED | OUTPATIENT
Start: 2025-08-14 | End: 2025-08-14

## 2025-08-14 RX ORDER — CARVEDILOL 3.12 MG/1
1 TABLET, FILM COATED ORAL
Qty: 60 | Refills: 0
Start: 2025-08-14 | End: 2025-09-12

## 2025-08-14 RX ORDER — CLOPIDOGREL BISULFATE 75 MG/1
1 TABLET, FILM COATED ORAL
Qty: 30 | Refills: 0
Start: 2025-08-14 | End: 2025-09-12

## 2025-08-14 RX ORDER — ASPIRIN 325 MG
1 TABLET ORAL
Qty: 30 | Refills: 0
Start: 2025-08-14 | End: 2025-09-12

## 2025-08-14 RX ADMIN — Medication 1 APPLICATION(S): at 05:59

## 2025-08-14 RX ADMIN — CLOPIDOGREL BISULFATE 75 MILLIGRAM(S): 75 TABLET, FILM COATED ORAL at 11:38

## 2025-08-14 RX ADMIN — Medication 81 MILLIGRAM(S): at 11:38

## 2025-08-14 RX ADMIN — COLCHICINE 0.6 MILLIGRAM(S): 0.6 TABLET, FILM COATED ORAL at 05:59

## 2025-08-14 RX ADMIN — HEPARIN SODIUM 5000 UNIT(S): 1000 INJECTION INTRAVENOUS; SUBCUTANEOUS at 05:59

## 2025-08-14 RX ADMIN — CARVEDILOL 6.25 MILLIGRAM(S): 3.12 TABLET, FILM COATED ORAL at 05:59

## 2025-08-14 RX ADMIN — HEPARIN SODIUM 5000 UNIT(S): 1000 INJECTION INTRAVENOUS; SUBCUTANEOUS at 13:41

## 2025-08-14 RX ADMIN — Medication 100 GRAM(S): at 05:59

## 2025-08-26 ENCOUNTER — APPOINTMENT (OUTPATIENT)
Dept: CARDIOLOGY | Facility: CLINIC | Age: 55
End: 2025-08-26
Payer: SELF-PAY

## 2025-08-26 VITALS
DIASTOLIC BLOOD PRESSURE: 85 MMHG | HEIGHT: 62 IN | SYSTOLIC BLOOD PRESSURE: 133 MMHG | HEART RATE: 66 BPM | BODY MASS INDEX: 25.4 KG/M2 | OXYGEN SATURATION: 99 % | WEIGHT: 138 LBS

## 2025-08-26 DIAGNOSIS — Z86.79 PERSONAL HISTORY OF OTHER DISEASES OF THE CIRCULATORY SYSTEM: ICD-10-CM

## 2025-08-26 DIAGNOSIS — Z82.49 FAMILY HISTORY OF ISCHEMIC HEART DISEASE AND OTHER DISEASES OF THE CIRCULATORY SYSTEM: ICD-10-CM

## 2025-08-26 DIAGNOSIS — Z98.890 OTHER SPECIFIED POSTPROCEDURAL STATES: ICD-10-CM

## 2025-08-26 PROCEDURE — 93000 ELECTROCARDIOGRAM COMPLETE: CPT

## 2025-08-26 PROCEDURE — 99204 OFFICE O/P NEW MOD 45 MIN: CPT

## 2025-08-26 PROCEDURE — G2211 COMPLEX E/M VISIT ADD ON: CPT

## 2025-08-26 RX ORDER — CARVEDILOL 6.25 MG/1
6.25 TABLET, FILM COATED ORAL TWICE DAILY
Qty: 180 | Refills: 2 | Status: ACTIVE | COMMUNITY

## 2025-08-26 RX ORDER — KRILL/OM-3/DHA/EPA/PHOSPHO/AST 1000-230MG
81 CAPSULE ORAL
Refills: 0 | Status: ACTIVE | COMMUNITY

## 2025-08-26 RX ORDER — ATORVASTATIN CALCIUM 80 MG/1
80 TABLET, FILM COATED ORAL
Refills: 0 | Status: ACTIVE | COMMUNITY

## 2025-08-26 RX ORDER — CLOPIDOGREL BISULFATE 75 MG/1
75 TABLET, FILM COATED ORAL
Refills: 0 | Status: ACTIVE | COMMUNITY